# Patient Record
Sex: FEMALE | Race: WHITE | NOT HISPANIC OR LATINO | Employment: FULL TIME | URBAN - METROPOLITAN AREA
[De-identification: names, ages, dates, MRNs, and addresses within clinical notes are randomized per-mention and may not be internally consistent; named-entity substitution may affect disease eponyms.]

---

## 2022-02-28 ENCOUNTER — TRANSFERRED RECORDS (OUTPATIENT)
Dept: HEALTH INFORMATION MANAGEMENT | Facility: CLINIC | Age: 57
End: 2022-02-28

## 2022-04-05 ENCOUNTER — TRANSFERRED RECORDS (OUTPATIENT)
Dept: HEALTH INFORMATION MANAGEMENT | Facility: CLINIC | Age: 57
End: 2022-04-05
Payer: COMMERCIAL

## 2022-05-11 ENCOUNTER — TRANSFERRED RECORDS (OUTPATIENT)
Dept: HEALTH INFORMATION MANAGEMENT | Facility: CLINIC | Age: 57
End: 2022-05-11
Payer: COMMERCIAL

## 2022-05-20 ENCOUNTER — TELEPHONE (OUTPATIENT)
Dept: ORTHOPEDICS | Facility: CLINIC | Age: 57
End: 2022-05-20
Payer: COMMERCIAL

## 2022-05-20 NOTE — TELEPHONE ENCOUNTER
OLELIJAHF Procedure L2 - L5 Consult / Elizabeth Burgos @ [facility] in [place] / Imaging done at [facility] in [place] (myelogram) and [facility] (MRI done in 2018 and 2019)    [facility] Call Center    Phone Message:  Please contact pt if SHE needs to sign a release for imaging, or if SHE needs to have the imaging transferred to [facility].    May a detailed message be left on voicemail: Yes     Reason for Call:  Imaging INQUIRY    Action Taken: Message routed to:  [facility] ([facility]): Team    Travel Screening: Not Applicable

## 2022-05-23 NOTE — TELEPHONE ENCOUNTER
DIAGNOSIS: OLLIF Procedure L2 - L5 Consult / Elizabeth Burgos @ [facility] in [place] / Imaging done at [facility] in [place] (myelogram) and [facility] (MRI done in 2018 and 2019)   APPOINTMENT DATE: 6.7.22   NOTES STATUS DETAILS   OFFICE NOTE from other provider Media Tab Scanned Date:  06/04/2022  Internal    Care Everywhere 01/01/2022 TO 06/03/2022 - Alyse Estrada MD - [facility]  10/23/2015 - Sai Coleman MD - [facility]  06/22/2015 - [facility] [place]  08/07/2014 - [facility]  11/01/2005 - [facility]   OPERATIVE REPORT Care Everywhere 4.30.19, 11.2.18, 7.13.18- [facility]   MEDICATION LIST Internal    LABS     CBC/DIFF Care Everywhere    MRI PACS 5.19.10 lumbar spine,   5.19.10 thoracic spine, [facility]   MYELOGRAM In Process Waiting on Imaging Disc..   XRAYS (IMAGES & REPORTS) PACS 4.12.19 thoracic spine, [facility]  7.13.18 spine, [facility]  3.8.18 lumbar spine, [facility]     Action 5.23.22 8:53 AM JOIE   Action Taken Faxed request to [facility] for records [phone]. Faxed request to [facility] [phone] and [facility] [phone] for images      Action 5.31.22 10:40 AM JOIE   Action Taken Faxed another request to [facility]. Faxed a request to [facility] for MRI images. Faxed another request to [facility].    6/1 - [facility] said they need signed JOE     Action June 2, 2022 4:37 PM MT   Action Taken Called Pt for JOE, pt confirmed she will come into the clinic to complete the form. Once JOE is completed, recs will be req again.     Action Lori 3, 2022 9:14 AM MT   Action Taken PT came in to [facility] clinic and signed JOE, JOE scanned & sent to HIM.   CSS sent a req to [facility] [phone].   4:37 PM  Recvd medical records from [facility] and sent to urgent HIM scan [phone].      Action June 6, 2022 8:47 AM MT   Action Taken CSS called [facility] [phone], rep states he will push over imgs.   4:20 PM  CSS recvd imgs and  resolved to PACS.      Action June 7, 2022 11:11 AM MT   Action Taken Recvd JOE for Orthology and sent to HIM.  Sent fax to [facility] for Physical Therapy notes.  Called [facility], they stated that they sent out a disc with the myelogram and it will be late for the appt.     Action June 8, 2022 12:52 PM MT   Action Taken CSS recvd img disc and sent to [facility] for upload.

## 2022-06-04 ENCOUNTER — HEALTH MAINTENANCE LETTER (OUTPATIENT)
Age: 57
End: 2022-06-04

## 2022-06-06 DIAGNOSIS — M54.9 BACK PAIN: Primary | ICD-10-CM

## 2022-06-07 ENCOUNTER — OFFICE VISIT (OUTPATIENT)
Dept: ORTHOPEDICS | Facility: CLINIC | Age: 57
End: 2022-06-07
Payer: COMMERCIAL

## 2022-06-07 ENCOUNTER — ANCILLARY PROCEDURE (OUTPATIENT)
Dept: GENERAL RADIOLOGY | Facility: CLINIC | Age: 57
End: 2022-06-07
Attending: ORTHOPAEDIC SURGERY
Payer: COMMERCIAL

## 2022-06-07 ENCOUNTER — PRE VISIT (OUTPATIENT)
Dept: ORTHOPEDICS | Facility: CLINIC | Age: 57
End: 2022-06-07
Payer: COMMERCIAL

## 2022-06-07 ENCOUNTER — HOSPITAL ENCOUNTER (OUTPATIENT)
Facility: CLINIC | Age: 57
End: 2022-06-07
Payer: COMMERCIAL

## 2022-06-07 DIAGNOSIS — M54.9 BACK PAIN: ICD-10-CM

## 2022-06-07 DIAGNOSIS — M41.50 DEGENERATIVE SCOLIOSIS IN ADULT PATIENT: ICD-10-CM

## 2022-06-07 DIAGNOSIS — M54.16 LEFT LUMBAR RADICULOPATHY: ICD-10-CM

## 2022-06-07 DIAGNOSIS — M47.816 SPONDYLOSIS OF LUMBAR SPINE: Primary | ICD-10-CM

## 2022-06-07 DIAGNOSIS — M47.816 SPONDYLOSIS OF LUMBAR SPINE: ICD-10-CM

## 2022-06-07 PROCEDURE — 99204 OFFICE O/P NEW MOD 45 MIN: CPT | Performed by: PHYSICIAN ASSISTANT

## 2022-06-07 PROCEDURE — 72082 X-RAY EXAM ENTIRE SPI 2/3 VW: CPT | Mod: 76 | Performed by: STUDENT IN AN ORGANIZED HEALTH CARE EDUCATION/TRAINING PROGRAM

## 2022-06-07 PROCEDURE — 72082 X-RAY EXAM ENTIRE SPI 2/3 VW: CPT | Performed by: STUDENT IN AN ORGANIZED HEALTH CARE EDUCATION/TRAINING PROGRAM

## 2022-06-07 RX ORDER — TIZANIDINE 2 MG/1
TABLET ORAL
Status: ON HOLD | COMMUNITY
Start: 2021-11-26 | End: 2022-08-19

## 2022-06-07 NOTE — LETTER
"    6/7/2022         RE: Joan Stallworth  [address]  [place] [address]        Dear Colleague,    Thank you for referring your patient, Joan Stallworth, to the [facility] [place]. Please see a copy of my visit note below.    Spine surgery hx:  11/02/2018 - ACDF C4-7, 50% inferior corpectomy of C4 [Medtronic Elite anterior cervical plate, PEEK/Tritanium device cages] for cervical stenosis C4-7 with radiculopathy & upper extremity weakness (Dr. Alexis Cox)  04/30/2019 - PSF C3-T2, cervical laminoforaminotomy C5-6 bilateral and T1-T2 bilateral [[place] lateral mass/pedicle screws] for cervical stenosis/ myelopathy, anterior hardware failrue, radiculopathy, neck pain (Dr. Alexis Cox)      REASON FOR CONSULTATION: Consult (Low back pain )     REFERRING PHYSICIAN: No ref. provider found   PCP:Tamie Mixon    History of Present Illness:    56 year old female LHD who presents today for evaluation of chronic low back pain and left leg radicular symptoms.  Patient is here for second opinion, has been seen by Dr. Alyse Tavares with [facility] who has recommended OLLIF L2-5.  Patient reports that her main complaint is difficulty walking due to \"very shifted\" stance towards the right.  Patient says that she has been off balance with her stance for multiple years, but has become significantly worse in the last 6 months.  She has started needing to use a cane in last 3 to 4 weeks due to the severe pain.  If she attempts to stand straight, it worsens her severe left lower extremity leg pain.  Her left leg symptoms radiate from her buttock into the groin, inner thigh, then radiate to outside of the knee, lateral calf, and into the arch of her foot.  She does not have much right lower extremity symptoms, but states that her right leg feels \"tired\".  Leg and back pain is worse with walking and standing.  Pain is improved with sitting.  She can only stand/walk for " 30 minutes maximum before pain becomes too severe.  She has attempted physical therapy, but this only made her pain worse.  Her medication she takes nortriptyline and tizanidine.  She had an injection in her low back April 11, 2022 which provided some relief for about a week.  They have also started her with a scoliosis brace that she wears for about 4 hours a day which is not helpful for her pain.    For neck symptoms, she says she continues to have some difficulty with this after previous cervical fusion surgeries with Dr. Cox, but this is not her main complaint. Continues to have some neck pain and hand symptoms.    Back 35%, Leg 65%,  Left > Right  Worse: standing/ walking  Better: sitting    Previous treatment:   - Physical therapy: attempted recently, worsened symptoms  - injections: April 11, 2022 BILLY (? Level) - 1 week of LBP relief  - Medications: tizandine, nortriptyline   - other: scoliosis brace on right (4hr per day) - not helpful    Social history:  Ambulates with assistance of cane  Work:  ([employer] work, meetings, currently works from home)  Tobacco use: denies  Lives at home      Oswestry (KATTY) Questionnaire    OSWESTRY DISABILITY INDEX 6/7/2022   Count 9   Sum 28   Oswestry Score (%) 62.22   Some recent data might be hidden         ROS:  A 12-point review of systems was completed and is negative except for otherwise noted above in the history of present illness.    Med Hx:  No past medical history on file.    Surg Hx:  No past surgical history on file.    Allergies:  Allergies   Allergen Reactions     Imitrex [Sumatriptan] Hives and Swelling     Sulfa Drugs Hives and Swelling     Lobster [Crustaceans] Hives and Rash       Meds:  Current Outpatient Medications   Medication     Cholecalciferol (VITAMIN D3 PO)     melatonin 5 MG tablet     NORTRIPTYLINE HCL PO     ASPIRIN PO     calcium-vitamin D (CALTRATE) 600-400 MG-UNIT per tablet     Chromium-Cinnamon (CINNAMON PLUS CHROMIUM PO)      Coenzyme Q10 (COQ10 PO)     GLUCOSAMINE SULFATE PO     Omega-3 Fatty Acids (OMEGA-3 FISH OIL PO)     tiZANidine (ZANAFLEX) 2 MG tablet     No current facility-administered medications for this visit.       Fam Hx:  No family history on file.    P/S Hx:  Social History     Tobacco Use     Smoking status: Former Smoker     Smokeless tobacco: Never Used   Substance Use Topics     Alcohol use: Not on file         Physical Exam:  Very pleasant, healthy appearing, alert, oriented x 3, cooperative.  Normal mood and affect.  Not in cardiorespiratory distress.  There were no vitals taken for this visit.  abormal upright posture, torso tilting towards right side, pain with attempting to stand straight upright.    Abnormal, severe antalgic gait without assistive device.  No imbalance.  unable to walk on toes and on heels with ease due to low back and left leg pain.  Severe pain with attempting to lay flat while supine, results in severe radicular left leg shooting pain  Back: no deformity, no skin lesions or surgical scars.  Localizes pain at L4-S1 with radiation to left.  Tenderness: (+) midline thoracic and lumbar, (+) paraspinal - left quadratus lumborum, (-) R and L PSIS.  ROM:   limitied painful extension and flexion    Neuro Exam:  Motor:        LOWER EXTREMITY Left Right   Hip flexion 5/5 5/5   Knee flexion 5/5 5/5   Knee extension 5/5 5/5   Ankle dorsiflexion 5/5 5/5   Ankle plantarflexion 5/5 5/5   Great toe extension 5/5 5/5      Sensory: decreased sensation to light touch in L4 & L5 distributions on the left.   Reflexes:  Knee 1+ bilat.  Ankle 1+ bilat.  (-) Babinski, (-) clonus.    Lower Extremity:  Equal leg lengths, full pulses, (-) atrophy / asymmetry.  Full painless passive knee and ankle motion.  - log roll. - hip ROM bilat    Straight leg raise: (-) right, (+) left.  Hip impingement: (-) right, (-) left.  NELA/Miles's: (-) right, (-) left.      Sacroiliac Joint Tests: unable to preform due to severe  LLE radicular pain patient experienced while lying flat on her back   Right Left   Uma Finger Test  - -   NELA  n/a n/a   Thigh Thrust: n/a n/a   Pelvic Compression Test  n/a n/a   Palpation  - -   Pelvic Gapping  n/a n/a   Gaenslen s Test  n/a n/a   Sacral Thrust (SI) n/a n/a     Imagin22 EOS full spine standing AP/lateral views: Cervical spine hardware present anterior C4-7 and posterior C3-T2. degenerative scoliosis curvature, left apex lumbar curve measured at 23 degrees from L3-S1. Right L3-4 lateral listesis. R>L symmetric disc collapse with osteophyte spurring L3-4, L4-5, L5-S1. L>R asymmetric disc collapse L2-3. right lumbosacral transitional vertebra.   CVA: 9.1cm to right    Impression:   56/f LHD with:  Lumbar spine:  1. Degenerative scoliosis, coronal imbalance (CVA 9.1cm to the right) with severe LLE radicular symptoms and chronic low back pain    2. History of 2018 ACDF C4-7 with pseudoarthrosis requiring 2019 PSF C3-T2 (Dr. Cox), with residual symptoms  Cervical spine:  1.  S/p AP fusion C3-T2 (2018, 2019 Dr. Cox), uncertain fusion status, with continued neck pain.    Plan:   Reviewed patient's EOS x-rays with her today which demonstrate significant degenerative scoliosis with worsening of coronal alignment compared to x-rays from 2018.  Interestingly, she has more severe symptoms along the left lower extremity in the distribution that suggests L3 radicular symptoms in addition to L4-5.  It is possible that there is findings of disc herniation/stretch on the left side at L4-5 and L5-S1 to be contributing to these symptoms.  We will need to obtain her CT [facility], but would also recommend getting thoracic and lumbar MRI.  Additionally would recommend getting supine and side bending views to assess the flexibility of the scoliotic curvature.  We will also need a updated DEXA scan to assess her bone density quality.  We will have her see Dr. Prado  Bari for BMD follow-up and prehabilitation.  We will have patient follow-up once she has completed the MRI, DEXA, XR supine/side bending films and when we have obtained at the CT myelogram results.  We briefly discussed that the surgery would likely involve T10-pelvis fusion.  Briefly discussed anterior/posterior versus posterior alone approach to fusion, but this all depends on what the advanced imaging shows us.    - ordered XR scoliosis supine & side bending views   - ordered DEXA  - ordered MRI lumbar/ thoracic w/o contrast   - retrieve records of CT myelogram cervical/ thoracic/ lumbar (from [facility], done February 2022)  - PM&R referral - Dr. Candelaria for BMD and pre-habilitation  - follow up virtual to review imaging and discuss surgical plan (tentative T10-pelvis)     All questions and concerns were answered to the patient's apparent satisfaction before leaving the clinic.     Respectfully,  Dorene Smith (jamilah Miller PA-C    Attestation:  I (Dr. Everette Guadarrama - Spine Surgeon) have personally evaluated patient with BELEN Smith, and agree with findings and plan outlined in the note, which I also edited.  I discussed at length with the patient/family, explained the nature of spinal condition, and formulated workup and/or treatment plan together.  All questions were answered to the best of my ability and to patient's apparent satisfaction.    45 minutes spent on the date of the encounter doing chart review/review of outside records/review of test results/interpretation of tests/patient visit/documentation/discussion with other provider(s)/discussion with patient and family.      Everette Guadarrama MD    Orthopaedic Spine Surgery  Dept Orthopaedic Surgery, [facility]  [phone] office, [phone] pager  [email or web address]

## 2022-06-07 NOTE — PROGRESS NOTES
"Spine surgery hx:  11/02/2018 - ACDF C4-7, 50% inferior corpectomy of C4 [Medtronic Elite anterior cervical plate, PEEK/Tritanium device cages] for cervical stenosis C4-7 with radiculopathy & upper extremity weakness (Dr. Alexis Cox)  04/30/2019 - PSF C3-T2, cervical laminoforaminotomy C5-6 bilateral and T1-T2 bilateral [[place] lateral mass/pedicle screws] for cervical stenosis/ myelopathy, anterior hardware failrue, radiculopathy, neck pain (Dr. Alexis Cox)      REASON FOR CONSULTATION: Consult (Low back pain )     REFERRING PHYSICIAN: No ref. provider found   PCP:Tamie Mixon    History of Present Illness:    56 year old female LHD who presents today for evaluation of chronic low back pain and left leg radicular symptoms.  Patient is here for second opinion, has been seen by Dr. Alyse Tavares with [facility] who has recommended OLLIF L2-5.  Patient reports that her main complaint is difficulty walking due to \"very shifted\" stance towards the right.  Patient says that she has been off balance with her stance for multiple years, but has become significantly worse in the last 6 months.  She has started needing to use a cane in last 3 to 4 weeks due to the severe pain.  If she attempts to stand straight, it worsens her severe left lower extremity leg pain.  Her left leg symptoms radiate from her buttock into the groin, inner thigh, then radiate to outside of the knee, lateral calf, and into the arch of her foot.  She does not have much right lower extremity symptoms, but states that her right leg feels \"tired\".  Leg and back pain is worse with walking and standing.  Pain is improved with sitting.  She can only stand/walk for 30 minutes maximum before pain becomes too severe.  She has attempted physical therapy, but this only made her pain worse.  Her medication she takes nortriptyline and tizanidine.  She had an injection in her low back April 11, 2022 which provided some relief for " about a week.  They have also started her with a scoliosis brace that she wears for about 4 hours a day which is not helpful for her pain.    For neck symptoms, she says she continues to have some difficulty with this after previous cervical fusion surgeries with Dr. Cox, but this is not her main complaint. Continues to have some neck pain and hand symptoms.    Back 35%, Leg 65%,  Left > Right  Worse: standing/ walking  Better: sitting    Previous treatment:   - Physical therapy: attempted recently, worsened symptoms  - injections: April 11, 2022 BILLY (? Level) - 1 week of LBP relief  - Medications: tizandine, nortriptyline   - other: scoliosis brace on right (4hr per day) - not helpful    Social history:  Ambulates with assistance of cane  Work:  (desk work, meetings, currently works from home)  Tobacco use: denies  Lives at home      Oswestry (KATTY) Questionnaire    OSWESTRY DISABILITY INDEX 6/7/2022   Count 9   Sum 28   Oswestry Score (%) 62.22   Some recent data might be hidden         ROS:  A 12-point review of systems was completed and is negative except for otherwise noted above in the history of present illness.    Med Hx:  No past medical history on file.    Surg Hx:  No past surgical history on file.    Allergies:  Allergies   Allergen Reactions     Imitrex [Sumatriptan] Hives and Swelling     Sulfa Drugs Hives and Swelling     Lobster [Crustaceans] Hives and Rash       Meds:  Current Outpatient Medications   Medication     Cholecalciferol (VITAMIN D3 PO)     melatonin 5 MG tablet     NORTRIPTYLINE HCL PO     ASPIRIN PO     calcium-vitamin D (CALTRATE) 600-400 MG-UNIT per tablet     Chromium-Cinnamon (CINNAMON PLUS CHROMIUM PO)     Coenzyme Q10 (COQ10 PO)     GLUCOSAMINE SULFATE PO     Omega-3 Fatty Acids (OMEGA-3 FISH OIL PO)     tiZANidine (ZANAFLEX) 2 MG tablet     No current facility-administered medications for this visit.       Fam Hx:  No family history on file.    P/S Hx:  Social  History     Tobacco Use     Smoking status: Former Smoker     Smokeless tobacco: Never Used   Substance Use Topics     Alcohol use: Not on file         Physical Exam:  Very pleasant, healthy appearing, alert, oriented x 3, cooperative.  Normal mood and affect.  Not in cardiorespiratory distress.  There were no vitals taken for this visit.  abormal upright posture, torso tilting towards right side, pain with attempting to stand straight upright.    Abnormal, severe antalgic gait without assistive device.  No imbalance.  unable to walk on toes and on heels with ease due to low back and left leg pain.  Severe pain with attempting to lay flat while supine, results in severe radicular left leg shooting pain  Back: no deformity, no skin lesions or surgical scars.  Localizes pain at L4-S1 with radiation to left.  Tenderness: (+) midline thoracic and lumbar, (+) paraspinal - left quadratus lumborum, (-) R and L PSIS.  ROM:   limitied painful extension and flexion    Neuro Exam:  Motor:        LOWER EXTREMITY Left Right   Hip flexion 5/5 5/5   Knee flexion 5/5 5/5   Knee extension 5/5 5/5   Ankle dorsiflexion 5/5 5/5   Ankle plantarflexion 5/5 5/5   Great toe extension 5/5 5/5      Sensory: decreased sensation to light touch in L4 & L5 distributions on the left.   Reflexes:  Knee 1+ bilat.  Ankle 1+ bilat.  (-) Babinski, (-) clonus.    Lower Extremity:  Equal leg lengths, full pulses, (-) atrophy / asymmetry.  Full painless passive knee and ankle motion.  - log roll. - hip ROM bilat    Straight leg raise: (-) right, (+) left.  Hip impingement: (-) right, (-) left.  NELA/Miles's: (-) right, (-) left.      Sacroiliac Joint Tests: unable to preform due to severe LLE radicular pain patient experienced while lying flat on her back   Right Left   Uma Finger Test  - -   NELA  n/a n/a   Thigh Thrust: n/a n/a   Pelvic Compression Test  n/a n/a   Palpation  - -   Pelvic Gapping  n/a n/a   Gaenslen s Test  n/a n/a   Sacral  Thrust (SI) n/a n/a     Imagin22 EOS full spine standing AP/lateral views: Cervical spine hardware present anterior C4-7 and posterior C3-T2. degenerative scoliosis curvature, left apex lumbar curve measured at 23 degrees from L3-S1. Right L3-4 lateral listesis. R>L symmetric disc collapse with osteophyte spurring L3-4, L4-5, L5-S1. L>R asymmetric disc collapse L2-3. right lumbosacral transitional vertebra.   CVA: 9.1cm to right    Impression:   56/f LHD with:  Lumbar spine:  1. Degenerative scoliosis, coronal imbalance (CVA 9.1cm to the right) with severe LLE radicular symptoms and chronic low back pain    2. History of 2018 ACDF C4-7 with pseudoarthrosis requiring 2019 PSF C3-T2 (Dr. Cox), with residual symptoms  Cervical spine:  1.  S/p AP fusion C3-T2 (2018, 2019 Dr. Cox), uncertain fusion status, with continued neck pain.    Plan:   Reviewed patient's EOS x-rays with her today which demonstrate significant degenerative scoliosis with worsening of coronal alignment compared to x-rays from 2018.  Interestingly, she has more severe symptoms along the left lower extremity in the distribution that suggests L3 radicular symptoms in addition to L4-5.  It is possible that there is findings of disc herniation/stretch on the left side at L4-5 and L5-S1 to be contributing to these symptoms.  We will need to obtain her CT [facility], but would also recommend getting thoracic and lumbar MRI.  Additionally would recommend getting supine and side bending views to assess the flexibility of the scoliotic curvature.  We will also need a updated DEXA scan to assess her bone density quality.  We will have her see Dr. Eve Candelaria for BMD follow-up and prehabilitation.  We will have patient follow-up once she has completed the MRI, DEXA, XR supine/side bending films and when we have obtained at the CT myelogram results.  We briefly discussed that the surgery would likely involve  "T10-pelvis fusion.  Briefly discussed anterior/posterior versus posterior alone approach to fusion, but this all depends on what the advanced imaging shows us.    - ordered XR scoliosis supine & side bending views   - ordered DEXA  - ordered MRI lumbar/ thoracic w/o contrast   - retrieve records of CT myelogram cervical/ thoracic/ lumbar (from [facility], done February 2022)  - PM&R referral - Dr. Candelaria for BMD and pre-habilitation  - follow up virtual to review imaging and discuss surgical plan (tentative T10-pelvis)     All questions and concerns were answered to the patient's apparent satisfaction before leaving the clinic.     Respectfully,  Dorene Smith (jamilah Yanes), BELEN    Attestation:  I (Dr. Everette Guadarrama - Spine Surgeon) have personally evaluated patient with BELEN Smith, and agree with findings and plan outlined in the note, which I also edited.  I discussed at length with the patient/family, explained the nature of spinal condition, and formulated workup and/or treatment plan together.  All questions were answered to the best of my ability and to patient's apparent satisfaction.    45 minutes spent on the date of the encounter doing chart review/review of outside records/review of test results/interpretation of tests/patient visit/documentation/discussion with other provider(s)/discussion with patient and family.      Everette Guadarrama MD    Orthopaedic Spine Surgery  Dept Orthopaedic Surgery, [facility]  [phone] office, [phone] pager  [email or web address]    Addendum 8/4/22:  Reviewed CT myelogram thoracic and lumbar reports from 2/28/22 (done at [facility] [place], [place]).  Impression:  \"Multilevel degenerative spondylosis and degenerative disc disease result in up to mild spinal canal stenosis at T6-7 through T8-9.\"  \"Multilevel degenerative spondylosis and degenerative disc disease.  Findings are most prominent at L2-3 where there is moderate left " "subarticular spinal canal stenosis, with mild bilateral foraminal stenosis.  Additional levels of bilateral foraminal stenosis, some severe, as detailed above.\"    "

## 2022-06-09 ENCOUNTER — TELEPHONE (OUTPATIENT)
Dept: ORTHOPEDICS | Facility: CLINIC | Age: 57
End: 2022-06-09
Payer: COMMERCIAL

## 2022-06-09 NOTE — TELEPHONE ENCOUNTER
[facility] Call Center    Phone Message    May a detailed message be left on voicemail: yes     Reason for Call: Other: patient needs Dorene to submit PA for her MRI & Dexa scan orders and her Spine referral      Action Taken: Message routed to:  [facility] ([facility]): ortho    Travel Screening: Not Applicable     Patient was directed by her ins. To have PA submitted for all the orders that were placed     Please c/b once done

## 2022-06-10 NOTE — TELEPHONE ENCOUNTER
See phone message from pt.   I called her back.  She stated she called her Insurance to check on approval for the above 4 imaging tests & the spine referral order for Bone health eval. That we ordered & they told her needs PA.  Can be faxed to fax#[phone] Ref#[id number].    I explained that radiology Dept & PMR dept each have their own financial securing depts who work on Insurance approval but I can fax the 5 orders & dictation to Insurance.  She requested that , so I faxed above.  She understands to be sure to contact rad & PMR to see if approved before appt so she doesn't get stuck with bills.  She has their ph#s.   Call back prn.  Pt agreed.  Izzy Hernández RN.

## 2022-06-27 ENCOUNTER — ANCILLARY PROCEDURE (OUTPATIENT)
Dept: BONE DENSITY | Facility: CLINIC | Age: 57
End: 2022-06-27
Attending: PHYSICIAN ASSISTANT
Payer: COMMERCIAL

## 2022-06-27 ENCOUNTER — TELEPHONE (OUTPATIENT)
Dept: ORTHOPEDICS | Facility: CLINIC | Age: 57
End: 2022-06-27

## 2022-06-27 DIAGNOSIS — M47.816 SPONDYLOSIS OF LUMBAR SPINE: ICD-10-CM

## 2022-06-27 DIAGNOSIS — M41.50 DEGENERATIVE SCOLIOSIS IN ADULT PATIENT: ICD-10-CM

## 2022-06-27 DIAGNOSIS — M54.16 LEFT LUMBAR RADICULOPATHY: ICD-10-CM

## 2022-06-27 PROCEDURE — 77080 DXA BONE DENSITY AXIAL: CPT | Performed by: INTERNAL MEDICINE

## 2022-06-27 PROCEDURE — 77081 DXA BONE DENSITY APPENDICULR: CPT | Mod: XU | Performed by: INTERNAL MEDICINE

## 2022-06-27 NOTE — TELEPHONE ENCOUNTER
[facility] Call Center    Phone Message    May a detailed message be left on voicemail: yes     Reason for Call: Other: Patient calling to have the Pre auth for Dexa scan and MRI sent to her insurance. Please call patient regarding this     Action Taken: Other: [facility]    Travel Screening: Not Applicable

## 2022-06-28 NOTE — TELEPHONE ENCOUNTER
See phone message from pt.  I called  Her back.    I provided her with ph#s for rad financial dept who do the PA for imaging done & scheduled.  Call back prn.  Pt agreed.    Izzy Hernández RN.

## 2022-07-06 ENCOUNTER — VIRTUAL VISIT (OUTPATIENT)
Dept: PHYSICAL MEDICINE AND REHAB | Facility: CLINIC | Age: 57
End: 2022-07-06
Attending: PHYSICIAN ASSISTANT
Payer: COMMERCIAL

## 2022-07-06 DIAGNOSIS — M54.16 LEFT LUMBAR RADICULOPATHY: ICD-10-CM

## 2022-07-06 DIAGNOSIS — M47.816 SPONDYLOSIS OF LUMBAR SPINE: ICD-10-CM

## 2022-07-06 DIAGNOSIS — M81.8 OTHER OSTEOPOROSIS WITHOUT CURRENT PATHOLOGICAL FRACTURE: Primary | ICD-10-CM

## 2022-07-06 DIAGNOSIS — M41.50 DEGENERATIVE SCOLIOSIS IN ADULT PATIENT: ICD-10-CM

## 2022-07-06 PROCEDURE — 99204 OFFICE O/P NEW MOD 45 MIN: CPT | Mod: 95 | Performed by: PHYSICAL MEDICINE & REHABILITATION

## 2022-07-06 ASSESSMENT — ANXIETY QUESTIONNAIRES
7. FEELING AFRAID AS IF SOMETHING AWFUL MIGHT HAPPEN: NOT AT ALL
1. FEELING NERVOUS, ANXIOUS, OR ON EDGE: NOT AT ALL
GAD7 TOTAL SCORE: 8
5. BEING SO RESTLESS THAT IT IS HARD TO SIT STILL: NEARLY EVERY DAY
2. NOT BEING ABLE TO STOP OR CONTROL WORRYING: NOT AT ALL
7. FEELING AFRAID AS IF SOMETHING AWFUL MIGHT HAPPEN: NOT AT ALL
GAD7 TOTAL SCORE: 8
4. TROUBLE RELAXING: MORE THAN HALF THE DAYS
8. IF YOU CHECKED OFF ANY PROBLEMS, HOW DIFFICULT HAVE THESE MADE IT FOR YOU TO DO YOUR WORK, TAKE CARE OF THINGS AT HOME, OR GET ALONG WITH OTHER PEOPLE?: NOT DIFFICULT AT ALL
3. WORRYING TOO MUCH ABOUT DIFFERENT THINGS: SEVERAL DAYS
GAD7 TOTAL SCORE: 8
6. BECOMING EASILY ANNOYED OR IRRITABLE: MORE THAN HALF THE DAYS

## 2022-07-06 ASSESSMENT — PATIENT HEALTH QUESTIONNAIRE - PHQ9
SUM OF ALL RESPONSES TO PHQ QUESTIONS 1-9: 9
SUM OF ALL RESPONSES TO PHQ QUESTIONS 1-9: 9
10. IF YOU CHECKED OFF ANY PROBLEMS, HOW DIFFICULT HAVE THESE PROBLEMS MADE IT FOR YOU TO DO YOUR WORK, TAKE CARE OF THINGS AT HOME, OR GET ALONG WITH OTHER PEOPLE: SOMEWHAT DIFFICULT

## 2022-07-06 NOTE — LETTER
2022     RE: Joan Stallworth  [address]  [place] [address]     Dear Colleague,    Thank you for referring your patient, Joan Stallworth, to the [facility] PHYSICAL MEDICINE AND REHABILITATION CLINIC [place] at [facility]. Please see a copy of my visit note below.    .Joan is a 56 year old who is being evaluated via a billable telephone visit.      What phone number would you like to be contacted at? [phone]  How would you like to obtain your AVS? MyChart  Phone call duration: 32 minutes    Answers for HPI/ROS submitted by the patient on 2022  If you checked off any problems, how difficult have these problems made it for you to do your work, take care of things at home, or get along with other people?: Somewhat difficult  PHQ9 TOTAL SCORE: 9  VIPUL 7 TOTAL SCORE: 8           PM&R Clinic Note     Patient Name: Joan Stallworth : 1965 Medical Record: [id number]     Requesting Physician/clinician: Dorene Smith, BELEN           History of Present Illness:     Joan Stallworth is a 56 year old female referred by Dr. Guadarrama for bone health/functional maximization prior to possible spine surgery. She has a history of 2 fragility fractures over the age of 50.  Recent DXA demonstrated osteopenia with lowest T-score of -1.5 at the radius.  She has never taken medication for osteoporosis or HRT.  Menopause was at roughly age 54.  She has a hsitory of vitamin D deficiency and takes 5,000 international unit(s) of D daily but I don't have a recent level for review. She does not get regular exercise.  Her last fall was 2022.        Fracture History:   Left ankle fracture age 44, fall while shoveling  5th metatarsal (not sure side) age 50, fall down 2 steps while vacuuming  Right ankle fracture age 54, rolled foot off a 6 inch retaining wall     Osteoanabolic contraindications:  No history of osteosarcoma, no stroke or MI history    Antiresorptive contraindications:  No invasive dental procedures planned    Age over 50 (female) or 75 (male): Yes, 56  Prior failed surgery (fracture, pseudarthrosis, or instrumentation failure):  Yes  Personal or family hx of previous low trauma fx of the hip or spine:  Yes  Diabetes Mellitus greater than 10 years or poor control:  No  Inflammatory arthritis:  No  Chronic corticosteroid use (5mg/day or   for more than 90 days):  No  Chronic kidney disease stage 3 and up:  No  Limited mobility:  Yes, started using a cane 4 weeks ago  Smokin pack year history, quit 40 years ago  Alcohol use 3 or more per day:    Personal or family hx of metabolic bone disease:  None  Liver disease:  No  Oophorectomy:  NA  Cancer tx: Rectal AIN 3, mass excised and checked regularly  Vitamin D Deficiency:  Not certain when last checked, does take vitamin D supplement  (5,000 international unit(s) daily)  Hx weight loss surgery:  No,  Anorexic as young adult  Hx frequent falls:  Last fall in 2022 on ice, no injury    Regular Physical Activity:  No regular exercise    Osteoporosis Medication Use:  No  Noted loss of height:  Dietary Intake:  Reproductive History: No, menopause at around age 54, no HRT    No medication for osteoporosis         Past Medical and Surgical History:     No past medical history on file.  No past surgical history on file.         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         Medications:     Current Outpatient Medications   Medication Sig Dispense Refill     ASPIRIN PO Take 81 mg by mouth daily       Cholecalciferol (VITAMIN D3 PO) Take 5,000 Units by mouth daily       melatonin 5 MG tablet        NORTRIPTYLINE HCL PO Take 10 mg by mouth At Bedtime       tiZANidine (ZANAFLEX) 2 MG tablet        calcium-vitamin D (CALTRATE) 600-400 MG-UNIT per tablet Take 1 tablet by mouth daily (Patient not taking: Reported on 6/7/2022)       Chromium-Cinnamon (CINNAMON PLUS CHROMIUM PO) Take 1 capsule by mouth daily (Patient not taking: Reported on 6/7/2022)       Coenzyme Q10 (COQ10 PO) Take 100 mg by mouth daily (Patient not taking: Reported on 6/7/2022)       GLUCOSAMINE SULFATE PO Take 1,500 mg by mouth daily (Patient not taking: Reported on 6/7/2022)       Omega-3 Fatty Acids (OMEGA-3 FISH OIL PO) Take 1 g by mouth daily (Patient not taking: Reported on 6/7/2022)              Allergies:     Allergies   Allergen Reactions     Imitrex [Sumatriptan] Hives and Swelling     Sulfa Drugs Hives and Swelling     Lobster [Crustaceans] Hives and Rash            ROS:     A focused ROS is negative other than the symptoms noted above in the HPI.           Physical Examiniation:     VITAL SIGNS: There were no vitals taken for this visit.  BMI: Estimated body mass index is 37.91 kg/m  as calculated from the following:    Height as of 10/23/15: 1.6 m (5' 3\").    Weight as of 10/23/15: 97.1 kg (214 lb).         Laboratory/Imaging:        Impression  Based on BMD diagnosis is consistent with low bone density based on WHO criteria Ref. 1     Results   Lumbar spine was excluded from analysis due to degenerative changes     Left femoral neck  T-score -0.4      Right femoral neck  T-score -0.7      Left Total femur  T-score 1.6 , BMD " is 1.216 g/cm2.     Right total femur  T-score 0.9, BMD is 1.116 g/cm2.     Radius: T-score -1.5, BMD 0.598 g/cm2     Interval change  No prior study available for comparison        Repeat  Recommend repeat DXA in 2 years.     Principal result :  Lora Christopher MD, ALBER HUTCHINSON  Division of Endocrinology and Diabetes    Department of Medicine     Technical quality  Satisfactory.   The spine was excluded from analysis. A distal radius was added as additional site of analysis. Results of the the distal radius are taken inconsideration in this report, for images and BMD values compare separate PACS report            Assessment/Plan:     This is a 64 YO female with OP based on fragility fracture history who is considering spine surgery.  She is at risk for fall related fracture and would benefit from PT given her mobility limitations.  She would benefit from an osteoanaoblic or an antiresorptive, but she would like to review patient handouts prior to committing to a specific drug as she has a lot of drug intolerances.  I will check vitamin Dand related bone health labs today given history of vitamin D deficiency.  RTC in 2-3 weeks to monitor progress.  I will give her a handout for osteoanabolics and antiresorptives and have directed her to the [email or web address] website (Reclast, Evenity, forteo, abaloparatide).        Eve Candelaria, DO  Physical Medicine & Rehabilitation    I spent a total of 32 minutes with Joan Stallworth  during today's phone visit.     16 minutes spent on the date of the encounter doing chart review, history and exam, documentation and further activities as noted above

## 2022-07-06 NOTE — PROGRESS NOTES
.Joan is a 56 year old who is being evaluated via a billable telephone visit.      What phone number would you like to be contacted at? [phone]  How would you like to obtain your AVS? Carin  Phone call duration: 32 minutes    Answers for HPI/ROS submitted by the patient on 2022  If you checked off any problems, how difficult have these problems made it for you to do your work, take care of things at home, or get along with other people?: Somewhat difficult  PHQ9 TOTAL SCORE: 9  VIPUL 7 TOTAL SCORE: 8           PM&R Clinic Note     Patient Name: Joan Stallworth : 1965 Medical Record: [id number]     Requesting Physician/clinician: Dorene Smith PA-C           History of Present Illness:     Joan Stallworth is a 56 year old female referred by Dr. Guadarrama for bone health/functional maximization prior to possible spine surgery. She has a history of 2 fragility fractures over the age of 50.  Recent DXA demonstrated osteopenia with lowest T-score of -1.5 at the radius.  She has never taken medication for osteoporosis or HRT.  Menopause was at roughly age 54.  She has a hsitory of vitamin D deficiency and takes 5,000 international unit(s) of D daily but I don't have a recent level for review. She does not get regular exercise.  Her last fall was 2022.        Fracture History:   Left ankle fracture age 44, fall while shoveling  5th metatarsal (not sure side) age 50, fall down 2 steps while vacuuming  Right ankle fracture age 54, rolled foot off a 6 inch retaining wall     Osteoanabolic contraindications:  No history of osteosarcoma, no stroke or MI history   Antiresorptive contraindications:  No invasive dental procedures planned    Age over 50 (female) or 75 (male): Yes, 56  Prior failed surgery (fracture, pseudarthrosis, or instrumentation failure):  Yes  Personal or family hx of previous low trauma fx of the hip or spine:  Yes  Diabetes Mellitus greater than 10 years or poor control:  No  Inflammatory  arthritis:  No  Chronic corticosteroid use (5mg/day or   for more than 90 days):  No  Chronic kidney disease stage 3 and up:  No  Limited mobility:  Yes, started using a cane 4 weeks ago  Smokin pack year history, quit 40 years ago  Alcohol use 3 or more per day:    Personal or family hx of metabolic bone disease:  None  Liver disease:  No  Oophorectomy:  NA  Cancer tx: Rectal AIN 3, mass excised and checked regularly  Vitamin D Deficiency:  Not certain when last checked, does take vitamin D supplement  (5,000 international unit(s) daily)  Hx weight loss surgery:  No,  Anorexic as young adult  Hx frequent falls:  Last fall in 2022 on ice, no injury    Regular Physical Activity:  No regular exercise    Osteoporosis Medication Use:  No  Noted loss of height:  Dietary Intake:  Reproductive History: No, menopause at around age 54, no HRT    No medication for osteoporosis         Past Medical and Surgical History:     No past medical history on file.  No past surgical history on file.         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         Medications:     Current Outpatient Medications   Medication Sig Dispense Refill     ASPIRIN PO Take 81 mg by mouth daily       Cholecalciferol (VITAMIN D3 PO) Take 5,000  "Units by mouth daily       melatonin 5 MG tablet        NORTRIPTYLINE HCL PO Take 10 mg by mouth At Bedtime       tiZANidine (ZANAFLEX) 2 MG tablet        calcium-vitamin D (CALTRATE) 600-400 MG-UNIT per tablet Take 1 tablet by mouth daily (Patient not taking: Reported on 6/7/2022)       Chromium-Cinnamon (CINNAMON PLUS CHROMIUM PO) Take 1 capsule by mouth daily (Patient not taking: Reported on 6/7/2022)       Coenzyme Q10 (COQ10 PO) Take 100 mg by mouth daily (Patient not taking: Reported on 6/7/2022)       GLUCOSAMINE SULFATE PO Take 1,500 mg by mouth daily (Patient not taking: Reported on 6/7/2022)       Omega-3 Fatty Acids (OMEGA-3 FISH OIL PO) Take 1 g by mouth daily (Patient not taking: Reported on 6/7/2022)              Allergies:     Allergies   Allergen Reactions     Imitrex [Sumatriptan] Hives and Swelling     Sulfa Drugs Hives and Swelling     Lobster [Crustaceans] Hives and Rash              ROS:     A focused ROS is negative other than the symptoms noted above in the HPI.           Physical Examiniation:     VITAL SIGNS: There were no vitals taken for this visit.  BMI: Estimated body mass index is 37.91 kg/m  as calculated from the following:    Height as of 10/23/15: 1.6 m (5' 3\").    Weight as of 10/23/15: 97.1 kg (214 lb).           Laboratory/Imaging:          Impression  Based on BMD diagnosis is consistent with low bone density based on WHO criteria Ref. 1     Results   Lumbar spine was excluded from analysis due to degenerative changes     Left femoral neck  T-score -0.4      Right femoral neck  T-score -0.7      Left Total femur  T-score 1.6 , BMD is 1.216 g/cm2.     Right total femur  T-score 0.9, BMD is 1.116 g/cm2.     Radius: T-score -1.5, BMD 0.598 g/cm2     Interval change  No prior study available for comparison        Repeat  Recommend repeat DXA in 2 years.     Principal result :  Lora Christopher MD, ALBER HUTCHINSON  Division of Endocrinology and Diabetes    Department of " Medicine     Technical quality  Satisfactory.   The spine was excluded from analysis. A distal radius was added as additional site of analysis. Results of the the distal radius are taken inconsideration in this report, for images and BMD values compare separate PACS report              Assessment/Plan:     This is a 64 YO female with OP based on fragility fracture history who is considering spine surgery.  She is at risk for fall related fracture and would benefit from PT given her mobility limitations.  She would benefit from an osteoanaoblic or an antiresorptive, but she would like to review patient handouts prior to committing to a specific drug as she has a lot of drug intolerances.  I will check vitamin Dand related bone health labs today given history of vitamin D deficiency.  RTC in 2-3 weeks to monitor progress.  I will give her a handout for osteoanabolics and antiresorptives and have directed her to the NOF.org website (Reclast, Evenity, forteo, abaloparatide).        Eve Candelaria, DO  Physical Medicine & Rehabilitation    I spent a total of 32 minutes with Joan Stallworth  during today's phone visit.     16 minutes spent on the date of the encounter doing chart review, history and exam, documentation and further activities as noted above

## 2022-07-06 NOTE — NURSING NOTE
Chief Complaint   Patient presents with     Telephone     Bone health, Lumbar spondylosis  referred by Dr Everette Guadarrama

## 2022-07-07 RX ORDER — OXYCODONE HYDROCHLORIDE 5 MG/1
5 TABLET ORAL EVERY 4 HOURS PRN
Status: CANCELLED | OUTPATIENT
Start: 2022-07-07

## 2022-07-07 RX ORDER — FENTANYL CITRATE 50 UG/ML
25 INJECTION, SOLUTION INTRAMUSCULAR; INTRAVENOUS EVERY 5 MIN PRN
Status: CANCELLED | OUTPATIENT
Start: 2022-07-07

## 2022-07-07 RX ORDER — SODIUM CHLORIDE, SODIUM LACTATE, POTASSIUM CHLORIDE, CALCIUM CHLORIDE 600; 310; 30; 20 MG/100ML; MG/100ML; MG/100ML; MG/100ML
INJECTION, SOLUTION INTRAVENOUS CONTINUOUS
Status: CANCELLED | OUTPATIENT
Start: 2022-07-07

## 2022-07-07 RX ORDER — ONDANSETRON 2 MG/ML
4 INJECTION INTRAMUSCULAR; INTRAVENOUS EVERY 30 MIN PRN
Status: CANCELLED | OUTPATIENT
Start: 2022-07-07

## 2022-07-07 RX ORDER — ONDANSETRON 4 MG/1
4 TABLET, ORALLY DISINTEGRATING ORAL EVERY 30 MIN PRN
Status: CANCELLED | OUTPATIENT
Start: 2022-07-07

## 2022-07-07 RX ORDER — MEPERIDINE HYDROCHLORIDE 25 MG/ML
12.5 INJECTION INTRAMUSCULAR; INTRAVENOUS; SUBCUTANEOUS
Status: CANCELLED | OUTPATIENT
Start: 2022-07-07

## 2022-07-07 RX ORDER — FENTANYL CITRATE 50 UG/ML
25 INJECTION, SOLUTION INTRAMUSCULAR; INTRAVENOUS
Status: CANCELLED | OUTPATIENT
Start: 2022-07-07

## 2022-07-07 RX ORDER — HYDROMORPHONE HYDROCHLORIDE 1 MG/ML
0.2 INJECTION, SOLUTION INTRAMUSCULAR; INTRAVENOUS; SUBCUTANEOUS EVERY 5 MIN PRN
Status: CANCELLED | OUTPATIENT
Start: 2022-07-07

## 2022-07-07 NOTE — OR NURSING
The following inbasket message was sent with high importance: Pt's sedated MRI has to be R/S because pt unaware of Pre-op H&P requirement  Received: Today  Coleen Keller, Izzy Thompson RN  Cc: Chaya Freeman; Mamie Smith; Erica Ivey RN Hi Lisa,     Pt is scheduled for ANESTHESIA OUT OF OR Magnetic resonance imaging thoracic and lumbar spine @0800 with sedation at the [facility] tomorrow. This needs to be rescheduled.     I did the pre-op call with pt. She said no one told her she needed a pre-op H&P or a Covid test. She said  she took the whole week off. While I was on the phone with her, she called  and [facility] to try and get an H&P today-no openings. My supervisor also checked PAC but there are no appts. Pt said she has had so many problems trying to get this scheduled. She said she is hyper frustrated.     Pt made a pre-op H&P appt on July 12 at [facility]. She has an appt with Dr Guadarrama on 7/28 to discuss MRI findings so the MRI will need to be rescheduled before then.     Please follow up with pt and help her to reschedule.     Thanks so much.     Coleen Keller, ODELL, BSN   Preanesthesia Screening   [phone]

## 2022-07-22 ENCOUNTER — MYC MEDICAL ADVICE (OUTPATIENT)
Dept: PHYSICAL MEDICINE AND REHAB | Facility: CLINIC | Age: 57
End: 2022-07-22

## 2022-07-27 ENCOUNTER — TELEPHONE (OUTPATIENT)
Dept: PHYSICAL MEDICINE AND REHAB | Facility: CLINIC | Age: 57
End: 2022-07-27

## 2022-07-27 NOTE — TELEPHONE ENCOUNTER
[facility] Call Center    Phone Message    May a detailed message be left on voicemail: yes     Reason for Call: Form or Letter   Type or form/letter needing completion: Ingredient list  Provider: Eve Candelaria  Date form needed: ASAP  Once completed: Mail form to Name: Basim Franco, at Address: [address] [place] [address]   [place] [address]    Patient hasnt received and ingredient list to fill out due to her medication allergies. Patient states it was suppose to be sent a month ago for her to fill out from Dr. Candelaria. Please call patient back at # [phone].      Action Taken: Message routed to:  [facility] PM&R    Travel Screening: Not Applicable

## 2022-07-27 NOTE — TELEPHONE ENCOUNTER
She is looking for patient handouts for osteoanabolics and ant-resorptive drugs.  Adding Cyn and Mary Ann to make sure they are provided to her.

## 2022-07-28 ENCOUNTER — MYC MEDICAL ADVICE (OUTPATIENT)
Dept: PHYSICAL MEDICINE AND REHAB | Facility: CLINIC | Age: 57
End: 2022-07-28

## 2022-07-28 NOTE — TELEPHONE ENCOUNTER
Pt was given website for medication handouts 7/6/22    Recommended to have her labwork done at a [facility] since her primary clinic ([facility] sends out to [place] and results are not received to referring provider (us) in a timely manner.    Pt was booked yesterday 7/27 for her lab draw at [facility] for 8/5    Physical therapy - no activity on booking this  Booked for MRI with anesthesia on 8/19/22    Responded to pt via [employer] - no voice mail option on her phone.    Checking to see what she thought she was supposed to fill out for Dr Candelaria.    Cyn Dalton, RN on 7/28/2022 at 7:14 PM

## 2022-07-28 NOTE — PROGRESS NOTES
C telopeptide orders corrected.  [facility] order cannot be seen by [facility]    Recommended to have pt get lab at a  clinic so results are readily available.  Cyn Dalton RN on 7/28/2022 at 6:27 PM

## 2022-08-05 ENCOUNTER — LAB (OUTPATIENT)
Dept: LAB | Facility: CLINIC | Age: 57
End: 2022-08-05
Payer: COMMERCIAL

## 2022-08-05 DIAGNOSIS — M47.816 SPONDYLOSIS OF LUMBAR SPINE: ICD-10-CM

## 2022-08-05 DIAGNOSIS — M54.16 LEFT LUMBAR RADICULOPATHY: ICD-10-CM

## 2022-08-05 DIAGNOSIS — M81.8 OTHER OSTEOPOROSIS WITHOUT CURRENT PATHOLOGICAL FRACTURE: ICD-10-CM

## 2022-08-05 DIAGNOSIS — M41.50 DEGENERATIVE SCOLIOSIS IN ADULT PATIENT: ICD-10-CM

## 2022-08-05 LAB — PTH-INTACT SERPL-MCNC: 47 PG/ML (ref 15–65)

## 2022-08-05 PROCEDURE — 82523 COLLAGEN CROSSLINKS: CPT | Mod: 90

## 2022-08-05 PROCEDURE — 99000 SPECIMEN HANDLING OFFICE-LAB: CPT

## 2022-08-05 PROCEDURE — 83970 ASSAY OF PARATHORMONE: CPT

## 2022-08-05 PROCEDURE — 83937 ASSAY OF OSTEOCALCIN: CPT | Mod: 90

## 2022-08-05 PROCEDURE — 82306 VITAMIN D 25 HYDROXY: CPT

## 2022-08-05 PROCEDURE — 36415 COLL VENOUS BLD VENIPUNCTURE: CPT

## 2022-08-05 PROCEDURE — 80053 COMPREHEN METABOLIC PANEL: CPT

## 2022-08-06 LAB
ALBUMIN SERPL-MCNC: 4 G/DL (ref 3.4–5)
ALP SERPL-CCNC: 107 U/L (ref 40–150)
ALT SERPL W P-5'-P-CCNC: 60 U/L (ref 0–50)
ANION GAP SERPL CALCULATED.3IONS-SCNC: 8 MMOL/L (ref 3–14)
AST SERPL W P-5'-P-CCNC: 34 U/L (ref 0–45)
BILIRUB SERPL-MCNC: 0.6 MG/DL (ref 0.2–1.3)
BUN SERPL-MCNC: 15 MG/DL (ref 7–30)
CALCIUM SERPL-MCNC: 9 MG/DL (ref 8.5–10.1)
CHLORIDE BLD-SCNC: 105 MMOL/L (ref 94–109)
CO2 SERPL-SCNC: 25 MMOL/L (ref 20–32)
CREAT SERPL-MCNC: 0.59 MG/DL (ref 0.52–1.04)
DEPRECATED CALCIDIOL+CALCIFEROL SERPL-MC: 79 UG/L (ref 20–75)
GFR SERPL CREATININE-BSD FRML MDRD: >90 ML/MIN/1.73M2
GLUCOSE BLD-MCNC: 180 MG/DL (ref 70–99)
OSTEOCALCIN SERPL-MCNC: 18 NG/ML
POTASSIUM BLD-SCNC: 3.8 MMOL/L (ref 3.4–5.3)
PROT SERPL-MCNC: 7.3 G/DL (ref 6.8–8.8)
SODIUM SERPL-SCNC: 138 MMOL/L (ref 133–144)

## 2022-08-07 ENCOUNTER — MYC MEDICAL ADVICE (OUTPATIENT)
Dept: PHYSICAL MEDICINE AND REHAB | Facility: CLINIC | Age: 57
End: 2022-08-07

## 2022-08-07 LAB — COLLAGEN CTX SERPL-MCNC: 331 PG/ML

## 2022-08-08 NOTE — TELEPHONE ENCOUNTER
Routed message to Dr Candelaria, who is out of office until 8/24/22 (writer notified pt of this date to expect response)    Routing message:  Pt's comments on her lab tests you ordered (done on 8/5/22) next f/u with you is 9/28/22 / medication handouts were mailed to her & physical therapy info sent in the encounter of 7/28/22.  Pending decision on drug therapy.

## 2022-08-08 NOTE — TELEPHONE ENCOUNTER
Polyethylene Glycol and Polysorbate 20 (Sorbitan) are added to pt's allergy list per her request.    A warning appeared when for vit D (on Joan's medication list) when Sorbitan was added, this writer is unable to evaluate potential interaction or make recommendations on the drug.  However, Pt has sent another [facility] message on 8/7/22 indicating: I will stop the Vit D intake.    her comment was based on reviewing her lab values done on 8/5/22.    Cyn Dalton RN on 8/8/2022 at 6:04 PM

## 2022-08-15 ENCOUNTER — TELEPHONE (OUTPATIENT)
Dept: ORTHOPEDICS | Facility: CLINIC | Age: 57
End: 2022-08-15

## 2022-08-15 NOTE — TELEPHONE ENCOUNTER
[facility] Call Center    Phone Message    May a detailed message be left on voicemail: yes     Reason for Call: Other: Skye calling regarding received notices that MRI was approved, but thoracic not approved, got two difference notices that some imaging has been denied, insurance needs to know that it medically necessary. Please call patient regarding this [phone]     Action Taken: Other: [facility]    Travel Screening: Not Applicable

## 2022-08-16 NOTE — TELEPHONE ENCOUNTER
See phone message from pt.  I called her back & confirmed that PA dept [facility] did get both MRIs approved that are scheduled for 8-19-22 & they did notify pt.  RTN appt with  scheduled.  Call back prn. Pt agreed.  Izzy Hernández RN.

## 2022-08-18 ENCOUNTER — ANESTHESIA EVENT (OUTPATIENT)
Dept: SURGERY | Facility: CLINIC | Age: 57
End: 2022-08-18
Payer: COMMERCIAL

## 2022-08-18 ASSESSMENT — LIFESTYLE VARIABLES: TOBACCO_USE: 1

## 2022-08-18 NOTE — ANESTHESIA PREPROCEDURE EVALUATION
Anesthesia Pre-Procedure Evaluation    Patient: Joan Stallworth   MRN: [id number] : 1965        Procedure : Procedure(s):  ANESTHESIA OUT OF OR Magnetic resonance imaging lumbar and thoracic spine without contrast @1100          Past Medical History:   Diagnosis Date     PONV (postoperative nausea and vomiting)       No past surgical history on file.   Allergies   Allergen Reactions     Imitrex [Sumatriptan] Hives and Swelling     Polyethylene Glycol Unknown     Polysorbate [Sorbitan] Unknown     Sulfa Drugs Hives and Swelling     Lobster [Crustaceans] Hives and Rash      Social History     Tobacco Use     Smoking status: Former Smoker     Smokeless tobacco: Never Used   Substance Use Topics     Alcohol use: Not on file      Wt Readings from Last 1 Encounters:   10/23/15 97.1 kg (214 lb)        Anesthesia Evaluation   Pt has had prior anesthetic. Type: General.    History of anesthetic complications  - PONV.  emergence agitation.    ROS/MED HX  ENT/Pulmonary: Comment: Quit smoking 40 years ago    (+) tobacco use, Past use, 12  Pack-Year Hx,  Intermittent, asthma     Neurologic:  - neg neurologic ROS     Cardiovascular:     (+) Dyslipidemia hypertension-----    METS/Exercise Tolerance:     Hematologic:       Musculoskeletal: Comment: Hx C4-C5 diskectomy in       GI/Hepatic: Comment: GERD: Not on medication    Anal Intraepithelial Neoplasia III    (+) GERD, Other,     Renal/Genitourinary:  - neg Renal ROS     Endo:  - neg endo ROS     Psychiatric/Substance Use: Comment: Adjustment disorder    (+) psychiatric history other (comment) and depression     Infectious Disease:  - neg infectious disease ROS     Malignancy:  - neg malignancy ROS     Other:      (+) , H/O Chronic Pain,        Physical Exam    Airway        Mallampati: II   TM distance: > 3 FB   Neck ROM: limited   Mouth opening: > 3 cm    Respiratory Devices and Support         Dental  no notable dental history         Cardiovascular   cardiovascular  exam normal       Rhythm and rate: regular and normal     Pulmonary           breath sounds clear to auscultation           OUTSIDE LABS:  CBC: No results found for: WBC, HGB, HCT, PLT  BMP:   Lab Results   Component Value Date     08/05/2022    POTASSIUM 3.8 08/05/2022    CHLORIDE 105 08/05/2022    CO2 25 08/05/2022    BUN 15 08/05/2022    CR 0.59 08/05/2022     (H) 08/05/2022     COAGS: No results found for: PTT, INR, FIBR  POC: No results found for: BGM, HCG, HCGS  HEPATIC:   Lab Results   Component Value Date    ALBUMIN 4.0 08/05/2022    PROTTOTAL 7.3 08/05/2022    ALT 60 (H) 08/05/2022    AST 34 08/05/2022    ALKPHOS 107 08/05/2022    BILITOTAL 0.6 08/05/2022     OTHER:   Lab Results   Component Value Date    AZEB 9.0 08/05/2022       Anesthesia Plan    ASA Status:  2      Anesthesia Type: General.     - Airway: ETT   Induction: Intravenous, Propofol.   Maintenance: Balanced.   Techniques and Equipment:     - Airway: Video-Laryngoscope     - Lines/Monitors: 2nd IV     Consents    Anesthesia Plan(s) and associated risks, benefits, and realistic alternatives discussed. Questions answered and patient/representative(s) expressed understanding.     - Discussed: Risks, Benefits and Alternatives for the PROCEDURE were discussed     - Discussed with:  Patient      - Extended Intubation/Ventilatory Support Discussed: No.      - Patient is DNR/DNI Status: No    Use of blood products discussed: No .     Postoperative Care    Pain management: IV analgesics.   PONV prophylaxis: Ondansetron (or other 5HT-3), Dexamethasone or Solumedrol, Background Propofol Infusion     Comments:                German Fernandez MD

## 2022-08-19 ENCOUNTER — HOSPITAL ENCOUNTER (OUTPATIENT)
Dept: MRI IMAGING | Facility: CLINIC | Age: 57
Discharge: HOME | End: 2022-08-19
Attending: PHYSICIAN ASSISTANT | Admitting: ANESTHESIOLOGY
Payer: COMMERCIAL

## 2022-08-19 ENCOUNTER — HOSPITAL ENCOUNTER (OUTPATIENT)
Facility: CLINIC | Age: 57
Discharge: HOME | End: 2022-08-19
Attending: ANESTHESIOLOGY | Admitting: ANESTHESIOLOGY
Payer: COMMERCIAL

## 2022-08-19 ENCOUNTER — ANESTHESIA (OUTPATIENT)
Dept: SURGERY | Facility: CLINIC | Age: 57
End: 2022-08-19
Payer: COMMERCIAL

## 2022-08-19 VITALS
HEIGHT: 63 IN | RESPIRATION RATE: 16 BRPM | OXYGEN SATURATION: 94 % | HEART RATE: 79 BPM | BODY MASS INDEX: 38.44 KG/M2 | DIASTOLIC BLOOD PRESSURE: 69 MMHG | WEIGHT: 216.93 LBS | TEMPERATURE: 98 F | SYSTOLIC BLOOD PRESSURE: 111 MMHG

## 2022-08-19 DIAGNOSIS — M47.816 SPONDYLOSIS OF LUMBAR SPINE: ICD-10-CM

## 2022-08-19 DIAGNOSIS — M41.50 DEGENERATIVE SCOLIOSIS IN ADULT PATIENT: ICD-10-CM

## 2022-08-19 DIAGNOSIS — M54.16 LEFT LUMBAR RADICULOPATHY: ICD-10-CM

## 2022-08-19 LAB — GLUCOSE BLDC GLUCOMTR-MCNC: 158 MG/DL (ref 70–99)

## 2022-08-19 PROCEDURE — 710N000012 HC RECOVERY PHASE 2, PER MINUTE

## 2022-08-19 PROCEDURE — 72146 MRI CHEST SPINE W/O DYE: CPT | Mod: 26 | Performed by: RADIOLOGY

## 2022-08-19 PROCEDURE — 72148 MRI LUMBAR SPINE W/O DYE: CPT

## 2022-08-19 PROCEDURE — 250N000011 HC RX IP 250 OP 636: Performed by: STUDENT IN AN ORGANIZED HEALTH CARE EDUCATION/TRAINING PROGRAM

## 2022-08-19 PROCEDURE — 370N000017 HC ANESTHESIA TECHNICAL FEE, PER MIN

## 2022-08-19 PROCEDURE — 999N000141 HC STATISTIC PRE-PROCEDURE NURSING ASSESSMENT

## 2022-08-19 PROCEDURE — 72148 MRI LUMBAR SPINE W/O DYE: CPT | Mod: 26 | Performed by: RADIOLOGY

## 2022-08-19 PROCEDURE — 250N000009 HC RX 250: Performed by: STUDENT IN AN ORGANIZED HEALTH CARE EDUCATION/TRAINING PROGRAM

## 2022-08-19 PROCEDURE — 258N000003 HC RX IP 258 OP 636: Performed by: STUDENT IN AN ORGANIZED HEALTH CARE EDUCATION/TRAINING PROGRAM

## 2022-08-19 PROCEDURE — 72146 MRI CHEST SPINE W/O DYE: CPT

## 2022-08-19 PROCEDURE — 250N000025 HC SEVOFLURANE, PER MIN

## 2022-08-19 PROCEDURE — 82962 GLUCOSE BLOOD TEST: CPT

## 2022-08-19 PROCEDURE — 710N000009 HC RECOVERY PHASE 1, LEVEL 1, PER MIN

## 2022-08-19 RX ORDER — KETAMINE HYDROCHLORIDE 10 MG/ML
INJECTION INTRAMUSCULAR; INTRAVENOUS PRN
Status: DISCONTINUED | OUTPATIENT
Start: 2022-08-19 | End: 2022-08-19

## 2022-08-19 RX ORDER — SODIUM CHLORIDE, SODIUM LACTATE, POTASSIUM CHLORIDE, CALCIUM CHLORIDE 600; 310; 30; 20 MG/100ML; MG/100ML; MG/100ML; MG/100ML
INJECTION, SOLUTION INTRAVENOUS CONTINUOUS
Status: DISCONTINUED | OUTPATIENT
Start: 2022-08-19 | End: 2022-08-19 | Stop reason: HOSPADM

## 2022-08-19 RX ORDER — PROPOFOL 10 MG/ML
INJECTION, EMULSION INTRAVENOUS CONTINUOUS PRN
Status: DISCONTINUED | OUTPATIENT
Start: 2022-08-19 | End: 2022-08-19

## 2022-08-19 RX ORDER — ONDANSETRON 2 MG/ML
INJECTION INTRAMUSCULAR; INTRAVENOUS PRN
Status: DISCONTINUED | OUTPATIENT
Start: 2022-08-19 | End: 2022-08-19

## 2022-08-19 RX ORDER — PROPOFOL 10 MG/ML
INJECTION, EMULSION INTRAVENOUS PRN
Status: DISCONTINUED | OUTPATIENT
Start: 2022-08-19 | End: 2022-08-19

## 2022-08-19 RX ORDER — HYDROMORPHONE HYDROCHLORIDE 1 MG/ML
0.2 INJECTION, SOLUTION INTRAMUSCULAR; INTRAVENOUS; SUBCUTANEOUS EVERY 5 MIN PRN
Status: DISCONTINUED | OUTPATIENT
Start: 2022-08-19 | End: 2022-08-19 | Stop reason: HOSPADM

## 2022-08-19 RX ORDER — LABETALOL HYDROCHLORIDE 5 MG/ML
10 INJECTION, SOLUTION INTRAVENOUS
Status: DISCONTINUED | OUTPATIENT
Start: 2022-08-19 | End: 2022-08-19 | Stop reason: HOSPADM

## 2022-08-19 RX ORDER — SODIUM CHLORIDE, SODIUM LACTATE, POTASSIUM CHLORIDE, CALCIUM CHLORIDE 600; 310; 30; 20 MG/100ML; MG/100ML; MG/100ML; MG/100ML
INJECTION, SOLUTION INTRAVENOUS CONTINUOUS PRN
Status: DISCONTINUED | OUTPATIENT
Start: 2022-08-19 | End: 2022-08-19

## 2022-08-19 RX ORDER — LIDOCAINE HYDROCHLORIDE 20 MG/ML
INJECTION, SOLUTION INFILTRATION; PERINEURAL PRN
Status: DISCONTINUED | OUTPATIENT
Start: 2022-08-19 | End: 2022-08-19

## 2022-08-19 RX ORDER — OXYCODONE HYDROCHLORIDE 5 MG/1
5 TABLET ORAL EVERY 4 HOURS PRN
Status: DISCONTINUED | OUTPATIENT
Start: 2022-08-19 | End: 2022-08-19 | Stop reason: HOSPADM

## 2022-08-19 RX ORDER — DEXAMETHASONE SODIUM PHOSPHATE 4 MG/ML
INJECTION, SOLUTION INTRA-ARTICULAR; INTRALESIONAL; INTRAMUSCULAR; INTRAVENOUS; SOFT TISSUE PRN
Status: DISCONTINUED | OUTPATIENT
Start: 2022-08-19 | End: 2022-08-19

## 2022-08-19 RX ORDER — ONDANSETRON 2 MG/ML
4 INJECTION INTRAMUSCULAR; INTRAVENOUS EVERY 30 MIN PRN
Status: DISCONTINUED | OUTPATIENT
Start: 2022-08-19 | End: 2022-08-19 | Stop reason: HOSPADM

## 2022-08-19 RX ORDER — ONDANSETRON 4 MG/1
4 TABLET, ORALLY DISINTEGRATING ORAL EVERY 30 MIN PRN
Status: DISCONTINUED | OUTPATIENT
Start: 2022-08-19 | End: 2022-08-19 | Stop reason: HOSPADM

## 2022-08-19 RX ORDER — DEXMEDETOMIDINE HYDROCHLORIDE 4 UG/ML
INJECTION, SOLUTION INTRAVENOUS PRN
Status: DISCONTINUED | OUTPATIENT
Start: 2022-08-19 | End: 2022-08-19

## 2022-08-19 RX ORDER — FENTANYL CITRATE 50 UG/ML
25 INJECTION, SOLUTION INTRAMUSCULAR; INTRAVENOUS EVERY 5 MIN PRN
Status: DISCONTINUED | OUTPATIENT
Start: 2022-08-19 | End: 2022-08-19 | Stop reason: HOSPADM

## 2022-08-19 RX ORDER — FENTANYL CITRATE 50 UG/ML
INJECTION, SOLUTION INTRAMUSCULAR; INTRAVENOUS PRN
Status: DISCONTINUED | OUTPATIENT
Start: 2022-08-19 | End: 2022-08-19

## 2022-08-19 RX ADMIN — LIDOCAINE HYDROCHLORIDE 100 MG: 20 INJECTION, SOLUTION INFILTRATION; PERINEURAL at 11:06

## 2022-08-19 RX ADMIN — PROPOFOL 40 MG: 10 INJECTION, EMULSION INTRAVENOUS at 11:09

## 2022-08-19 RX ADMIN — PHENYLEPHRINE HYDROCHLORIDE 100 MCG: 10 INJECTION INTRAVENOUS at 11:14

## 2022-08-19 RX ADMIN — PROPOFOL 125 MCG/KG/MIN: 10 INJECTION, EMULSION INTRAVENOUS at 11:06

## 2022-08-19 RX ADMIN — Medication 20 MG: at 11:09

## 2022-08-19 RX ADMIN — Medication 50 MG: at 11:06

## 2022-08-19 RX ADMIN — Medication 30 MG: at 11:06

## 2022-08-19 RX ADMIN — DEXAMETHASONE SODIUM PHOSPHATE 8 MG: 4 INJECTION, SOLUTION INTRA-ARTICULAR; INTRALESIONAL; INTRAMUSCULAR; INTRAVENOUS; SOFT TISSUE at 11:06

## 2022-08-19 RX ADMIN — ONDANSETRON 4 MG: 2 INJECTION INTRAMUSCULAR; INTRAVENOUS at 12:59

## 2022-08-19 RX ADMIN — DEXMEDETOMIDINE 8 MCG: 100 INJECTION, SOLUTION, CONCENTRATE INTRAVENOUS at 12:40

## 2022-08-19 RX ADMIN — SODIUM CHLORIDE, POTASSIUM CHLORIDE, SODIUM LACTATE AND CALCIUM CHLORIDE: 600; 310; 30; 20 INJECTION, SOLUTION INTRAVENOUS at 11:02

## 2022-08-19 RX ADMIN — MIDAZOLAM 2 MG: 1 INJECTION INTRAMUSCULAR; INTRAVENOUS at 10:55

## 2022-08-19 RX ADMIN — DEXMEDETOMIDINE 8 MCG: 100 INJECTION, SOLUTION, CONCENTRATE INTRAVENOUS at 12:33

## 2022-08-19 RX ADMIN — FENTANYL CITRATE 50 MCG: 50 INJECTION, SOLUTION INTRAMUSCULAR; INTRAVENOUS at 11:09

## 2022-08-19 RX ADMIN — ONDANSETRON 4 MG: 2 INJECTION INTRAMUSCULAR; INTRAVENOUS at 11:06

## 2022-08-19 RX ADMIN — PROPOFOL 80 MG: 10 INJECTION, EMULSION INTRAVENOUS at 11:06

## 2022-08-19 RX ADMIN — SUGAMMADEX 200 MG: 100 INJECTION, SOLUTION INTRAVENOUS at 12:39

## 2022-08-19 ASSESSMENT — ACTIVITIES OF DAILY LIVING (ADL)
ADLS_ACUITY_SCORE: 33
ADLS_ACUITY_SCORE: 35
ADLS_ACUITY_SCORE: 35

## 2022-08-19 NOTE — INTERVAL H&P NOTE
"I have reviewed the surgical (or preoperative) H&P that is linked to this encounter, and examined the patient. There are no significant changes    Clinical Conditions Present on Arrival:  Clinically Significant Risk Factors Present on Admission                   # Obesity: Estimated body mass index is 38.43 kg/m  as calculated from the following:    Height as of this encounter: 1.6 m (5' 3\").    Weight as of this encounter: 98.4 kg (216 lb 14.9 oz).       "
TAY Garza

## 2022-08-19 NOTE — DISCHARGE INSTRUCTIONS
"[facility], [place]  Same-Day Surgery   Adult Discharge Orders & Instructions       *Take it easy when you get home.  Remember, same day surgery DOES NOT MEAN SAME DAY RECOVERY!    *Healing is a gradual process and you will need some time to recover - you may be more tired than you realize at first.    *Rest and relax for at least the first 24 hours at home.  You'll feel better and heal faster if you take good care of yourself.    For 24 hours after surgery    A responsible adult must stay with you for at least 24 hours after you leave the hospital.   Do not drink alcohol, drive, or use heavy equipment for 24 hours. This is because you have had anesthesia medications.  Avoid strenuous and risky activities for 24 hours.   You may feel lightheaded, if so, sit for a few minutes before standing.  You may need someone to help you get up. Ask for help when climbing stairs.  If you have nausea: Drink only clear liquids such as water, juice, soda, or broth. Rest may also help. Be sure to drink enough fluids. Move to a  regular diet as you feel able.  You may have a slight fever. Call the doctor if your fever is over 100 F (37.7 C) (taken under the tongue) or lasts longer than 24 hours.  You might have a dry mouth, sore throat, muscle aches or trouble sleeping.  These should go away after 24 hours.  Do not make important or legal decisions.       Call your doctor for any of the followin.  Signs of infection: fever, growing tenderness at the surgery site, a large amount of drainage or bleeding, severe pain, foul-smelling drainage, redness, swelling. Please call if you experience any of these symptoms.    2. If it has been 8 to 10 hours since surgery and you are still not able to urinate (pass water), please call.    3.  If you have a headache for over 24 hours, please call.      To contact a doctor, call your primary care provider or:    ' [phone] and ask for \"the resident on call " "for anesthesia \" (this is the hospital and is answered 24 hours a day)    '   Emergency Department: [facility]: [phone]       (TTY for hearing impaired: [phone])    "

## 2022-08-19 NOTE — ANESTHESIA PROCEDURE NOTES
Airway       Patient location during procedure: OR       Procedure Start/Stop Times: 8/19/2022 11:09 AM  Staff -        Resident/Fellow: German Fernandez MD       Performed By: resident  Consent for Airway        Urgency: elective  Indications and Patient Condition       Indications for airway management: cindi-procedural       Induction type:intravenous       Mask difficulty assessment: 1 - vent by mask    Final Airway Details       Final airway type: endotracheal airway       Successful airway: ETT - single  Endotracheal Airway Details        ETT size (mm): 7.5       Cuffed: yes       Successful intubation technique: video laryngoscopy       VL Blade Size: MAC 4       Grade View of Cords: 1       Adjucts: stylet       Position: Center       Measured from: gums/teeth       Secured at (cm): 21       Bite block used: None    Post intubation assessment        Placement verified by: capnometry, equal breath sounds and chest rise        Number of attempts at approach: 1       Number of other approaches attempted: 0       Secured with: silk tape       Ease of procedure: easy       Dentition: Intact    Medication(s) Administered   Medication Administration Time: 8/19/2022 11:09 AM

## 2022-08-19 NOTE — ANESTHESIA POSTPROCEDURE EVALUATION
Patient: Joan Stallworth    Procedure: Procedure(s):  ANESTHESIA OUT OF OR Magnetic resonance imaging lumbar and thoracic spine without contrast @1100       Anesthesia Type:  General    Note:  Disposition: Outpatient   Postop Pain Control: Uneventful            Sign Out: Well controlled pain   PONV: No   Neuro/Psych: Uneventful            Sign Out: Acceptable/Baseline neuro status   Airway/Respiratory: Uneventful            Sign Out: Acceptable/Baseline resp. status   CV/Hemodynamics: Uneventful            Sign Out: Acceptable CV status; No obvious hypovolemia; No obvious fluid overload   Other NRE: NONE   DID A NON-ROUTINE EVENT OCCUR? No           Last vitals:  Vitals Value Taken Time   /71 08/19/22 1254   Temp 36.8  C (98.2  F) 08/19/22 1242   Pulse 77 08/19/22 1258   Resp 14 08/19/22 1252   SpO2 100 % 08/19/22 1258   Vitals shown include unvalidated device data.    Electronically Signed By: Froylan Martell MD  August 19, 2022  1:00 PM

## 2022-08-23 ASSESSMENT — ENCOUNTER SYMPTOMS
PALPITATIONS: 0
DISTURBANCES IN COORDINATION: 0
LOSS OF CONSCIOUSNESS: 0
SLEEP DISTURBANCES DUE TO BREATHING: 0
PARALYSIS: 0
ORTHOPNEA: 0
EXERCISE INTOLERANCE: 0
JOINT SWELLING: 0
NUMBNESS: 1
LEG PAIN: 1
LIGHT-HEADEDNESS: 0
MUSCLE CRAMPS: 0
NECK PAIN: 1
ARTHRALGIAS: 1
SPEECH CHANGE: 0
TREMORS: 0
MEMORY LOSS: 0
TINGLING: 1
HYPOTENSION: 0
SYNCOPE: 0
MYALGIAS: 1
BACK PAIN: 1
MUSCLE WEAKNESS: 0
DIZZINESS: 0
HEADACHES: 1
SEIZURES: 0
HYPERTENSION: 1
WEAKNESS: 1
STIFFNESS: 1

## 2022-08-24 DIAGNOSIS — M41.50 DEGENERATIVE SCOLIOSIS IN ADULT PATIENT: Primary | ICD-10-CM

## 2022-08-24 DIAGNOSIS — M81.8 OTHER OSTEOPOROSIS WITHOUT CURRENT PATHOLOGICAL FRACTURE: ICD-10-CM

## 2022-08-24 NOTE — RESULT ENCOUNTER NOTE
Braulio Us, can you reach out to Joan to help her set up PT near her home?  Also, I'd like to recheck her vitamin D in 2 months.

## 2022-09-01 ENCOUNTER — OFFICE VISIT (OUTPATIENT)
Dept: ORTHOPEDICS | Facility: CLINIC | Age: 57
End: 2022-09-01
Payer: COMMERCIAL

## 2022-09-01 DIAGNOSIS — M41.50 DEGENERATIVE SCOLIOSIS IN ADULT PATIENT: ICD-10-CM

## 2022-09-01 DIAGNOSIS — M40.36 FLATBACK SYNDROME OF LUMBAR REGION: ICD-10-CM

## 2022-09-01 DIAGNOSIS — M48.061 SPINAL STENOSIS OF LUMBAR REGION WITHOUT NEUROGENIC CLAUDICATION: Primary | ICD-10-CM

## 2022-09-01 PROCEDURE — 99215 OFFICE O/P EST HI 40 MIN: CPT | Mod: GC | Performed by: ORTHOPAEDIC SURGERY

## 2022-09-01 NOTE — LETTER
9/1/2022         RE: Joan Stallworth  [address]  [place] [address]        Dear Colleague,    Thank you for referring your patient, Joan Stallworth, to the [facility] [place]. Please see a copy of my visit note below.    Spine Surgical Hx:  11/02/2018 - ACDF C4-7, 50% inferior corpectomy of C4 [[employer]tronic Elite anterior cervical plate, PEEK/Tritanium device cages] for cervical stenosis C4-7 with radiculopathy & upper extremity weakness (Dr. Alexis Cox)  04/30/2019 - PSF C3-T2, cervical laminoforaminotomy C5-6 bilateral and T1-T2 bilateral [[place] [employer] lateral mass/pedicle screws] for cervical stenosis/ myelopathy, anterior hardware failrue, radiculopathy, neck pain (Dr. Alexis Cox)    BMD Hx:   7/6/22 - DEXA hip/wrist.  T-score = -1.5 (radius).  Imp: Osteopenia.  7/6/22 - Saw Dr. Candelaria (PM&R).  Imp:  Osteoporosis with hx of fragility fracture.  P> Labs.  RTC 2-3 wks.  Will consider osteoanabolics.      Reason for Visit:   Chief Complaint   Patient presents with     RECHECK     MRI and Dexa Scan results       Last clinic visit: 6/7/2022  Previous Impression:  1. Degenerative scoliosis, coronal imbalance (CVA 9.1cm to the right) with severe LLE radicular symptoms and chronic low back pain    2. History of 2018 ACDF C4-7 with pseudoarthrosis requiring 2019 PSF C3-T2 (Dr. Cox), with residual symptoms  Cervical spine:  3.  S/p AP fusion C3-T2 (2018, 2019 Dr. Cox), uncertain fusion status, with continued neck pain.  Previous Plan:  - ordered XR scoliosis supine & side bending views   - ordered DEXA  - ordered MRI lumbar/ thoracic w/o contrast   - retrieve records of CT myelogram cervical/ thoracic/ lumbar (from [facility], done February 2022)  - PM&R referral - Dr. Candelaria for BMD and pre-habilitation  - follow up virtual to review imaging and discuss surgical plan (tentative T10-pelvis)        S> 56-year-old LHD female who presents to clinic for  "review of imaging and to discuss possible surgical plan. She was seen on 6/7/22 for low back pain and radicular symptoms.     She continues to feel her posture is shifted when she is walking and it has become worse over the past several months. She is continuing to use a cane for balance and pain. If she attempts to stand straight, it worsens her severe left lower extremity leg pain.  Her left leg symptoms radiate from her buttock into the groin, inner thigh, then radiate to outside of the knee, lateral calf, and into the arch of her foot.  She does not have much right lower extremity symptoms, but states that her right leg feels \"tired\". Her pain is improved with sitting/rest and worsened by walking and standing.     She attempted 12 sessions of PT at [facility] in [place] from Feb-May of 2022 without relief of symptoms.  They have also started her with a scoliosis brace that she wears for about 4 hours a day which is not helpful for her pain.    She had an injection in her low back April 11, 2022 which provided some relief for about a week.        Oswestry (KATTY) Questionnaire    OSWESTRY DISABILITY INDEX 8/23/2022   Count 10   Sum 19   Oswestry Score (%) 38   Some recent data might be hidden      KATTY 6/7/22 62.22%  KATTY 8/23/22 38%       PROMIS-10 Scores  Global Mental Health Score: (P) 12  Global Physical Health Score: (P) 12  PROMIS TOTAL - SUBSCORES: (P) 24    O>   Alert, oriented x 3, cooperative.  Not in CP distress.  There were no vitals taken for this visit.  Very pleasant, healthy appearing, alert, oriented x 3, cooperative.  Normal mood and affect.  Not in cardiorespiratory distress.  abormal upright posture, torso tilting towards right side, pain with attempting to stand straight upright.    Abnormal, severe antalgic gait without assistive device.  No imbalance.  unable to walk on toes and on heels with ease due to low back and left leg pain.  Severe pain with attempting to lay flat while supine, " "results in severe radicular left leg shooting pain  Back: no deformity, no skin lesions or surgical scars.  Localizes pain at L4-S1 with radiation to left.  Tenderness: (+) midline thoracic and lumbar, (+) paraspinal - left quadratus lumborum, (-) R and L PSIS.  ROM:    limitied painful extension and flexion     Neuro Exam:  Motor:                     LOWER EXTREMITY Left Right   Hip flexion 5/5 5/5   Knee flexion 5/5 5/5   Knee extension 5/5 5/5   Ankle dorsiflexion 5/5 5/5   Ankle plantarflexion 5/5 5/5   Great toe extension 5/5 5/5      Sensory: decreased sensation to light touch in L4 & L5 distributions on the left.   Reflexes:  Knee 1+ bilat.  Ankle 1+ bilat.  (-) Babinski, (-) clonus.     Lower Extremity:  Equal leg lengths, full pulses, (-) atrophy / asymmetry.  Full painless passive knee and ankle motion.  - log roll. - hip ROM bilat     Straight leg raise: (-) right, (+) left.  Hip impingement: (-) right, (-) left.  NELA/Miles's: (-) right, (-) left.                            Imaging:   CT myelogram thoracic and lumbar 2/28/22 (done at [facility] [place], [place]).    \"Multilevel degenerative spondylosis and degenerative disc disease result in up to mild spinal canal stenosis at T6-7 through T8-9.\"  \"Multilevel degenerative spondylosis and degenerative disc disease.  Findings are most prominent at L2-3 where there is moderate left subarticular spinal canal stenosis, with mild bilateral foraminal stenosis.  Additional levels of bilateral foraminal stenosis, some severe, as detailed above.\"    Thoracic and Lumbar  MRI 8/19/22:  Degenerative changes throughout the thoracolumbar spine, without high-grade spinal canal stenosis. Multilevel neural foraminal  narrowings in the lumbar spine.    EOS full spine ap-lat x-rays from 6/7/22 re-reviewed.  Sagittal measurements:  LL 17, ideal 48.5  L4-S1 26, ideal 32  PI 41  PT 27  SVA +4.5cm  TPA 24  GT 31  T10-L2 kyph 4    A>  56/f LHD with:  Lumbar spine:  1. " Degenerative scoliosis, coronal imbalance (CVA 9.1cm to the right) with severe LLE radicular symptoms and chronic low back pain.  2.  Lumbar flatback deformity (LL = 17 deg, ideal 48.5), with compensatory pelvic retroversion (PT = 27).  Cervical spine:  3.  S/p AP fusion C3-T2 (2018, 2019 Dr. Cox), uncertain fusion status, with continued neck pain.  4. Osteoporosis with hx of fragility fractures.    P>   Patient with history of significant degenerative scoliosis with worsening coronal alignment presents today to review imaging.  Her MRI findings show high-grade spinal canal stenosis and multilevel neuroforaminal narrowing in the lumbar spine.  Discussed with the patient at these imaging findings are likely contributing to her significant lumbar radicular symptoms.  Reviewed the DEXA scan patient which showed findings consistent with osteoporosis.  Recommend following up with Dr. Candelaria prior to surgery to initiate Reclast treatment.     -Case request placed:  Posterior instrumented spinal fusion thoracic 10 to sacrum, with bilateral pelvic fixation; Transforaminal lumbar interbody fusion with Davis-Alvares osteotomies lumbar 2-sacrum; Left facetectomy-diskectomy lumbar 1-2; Use of Infuse bone morphogenetic protein Large kit and allograft.  -PAC referral  -Follow up with Dr. Candelaria as scheduled on 9/28/22, recommend initiating Reclast prior to surgery     The patient was seen and discussed with Dr. Thierry Jackson MD  PGY-1 Orthopaedic Surgery    Attestation:  I (Dr. Everette Guadarrama - Spine Surgeon) have personally evaluated patient with PGY-1 Ren[place], and agree with findings and plan outlined in the note, which I also edited.  I discussed at length with the patient/family, explained the nature of spinal condition, and formulated workup and/or treatment plan together.  All questions were answered to the best of my ability and to patient's apparent satisfaction.    40 minutes spent on the date of the  encounter doing chart review/review of outside records/review of test results/interpretation of tests/patient visit/documentation/discussion with other provider(s)/discussion with patient and family.

## 2022-09-01 NOTE — PROGRESS NOTES
Spine Surgical Hx:  11/02/2018 - ACDF C4-7, 50% inferior corpectomy of C4 [Medtronic Elite anterior cervical plate, PEEK/Tritanium device cages] for cervical stenosis C4-7 with radiculopathy & upper extremity weakness (Dr. Alexis Cox)  04/30/2019 - PSF C3-T2, cervical laminoforaminotomy C5-6 bilateral and T1-T2 bilateral [Frances Oasis lateral mass/pedicle screws] for cervical stenosis/ myelopathy, anterior hardware failrue, radiculopathy, neck pain (Dr. Alexis Cox)    BMD Hx:   7/6/22 - DEXA hip/wrist.  T-score = -1.5 (radius).  Imp: Osteopenia.  7/6/22 - Saw Dr. Candelaria (PM&R).  Imp:  Osteoporosis with hx of fragility fracture.  P> Labs.  RTC 2-3 wks.  Will consider osteoanabolics.      Reason for Visit:   Chief Complaint   Patient presents with     RECHECK     MRI and Dexa Scan results       Last clinic visit: 6/7/2022  Previous Impression:  1. Degenerative scoliosis, coronal imbalance (CVA 9.1cm to the right) with severe LLE radicular symptoms and chronic low back pain    2. History of 2018 ACDF C4-7 with pseudoarthrosis requiring 2019 PSF C3-T2 (Dr. Cox), with residual symptoms  Cervical spine:  3.  S/p AP fusion C3-T2 (2018, 2019 Dr. Cox), uncertain fusion status, with continued neck pain.  Previous Plan:  - ordered XR scoliosis supine & side bending views   - ordered DEXA  - ordered MRI lumbar/ thoracic w/o contrast   - retrieve records of CT myelogram cervical/ thoracic/ lumbar (from [facility], done February 2022)  - PM&R referral - Dr. Candelaria for BMD and pre-habilitation  - follow up virtual to review imaging and discuss surgical plan (tentative T10-pelvis)        S> 56-year-old LHD female who presents to clinic for review of imaging and to discuss possible surgical plan. She was seen on 6/7/22 for low back pain and radicular symptoms.     She continues to feel her posture is shifted when she is walking and it has become worse over the past several months. She is continuing to  "use a cane for balance and pain. If she attempts to stand straight, it worsens her severe left lower extremity leg pain.  Her left leg symptoms radiate from her buttock into the groin, inner thigh, then radiate to outside of the knee, lateral calf, and into the arch of her foot.  She does not have much right lower extremity symptoms, but states that her right leg feels \"tired\". Her pain is improved with sitting/rest and worsened by walking and standing.     She attempted 12 sessions of PT at [facility] in [place] from Feb-May of 2022 without relief of symptoms.  They have also started her with a scoliosis brace that she wears for about 4 hours a day which is not helpful for her pain.    She had an injection in her low back April 11, 2022 which provided some relief for about a week.        Oswestry (KATTY) Questionnaire    OSWESTRY DISABILITY INDEX 8/23/2022   Count 10   Sum 19   Oswestry Score (%) 38   Some recent data might be hidden      KATTY 6/7/22 62.22%  KATTY 8/23/22 38%       PROMIS-10 Scores  Global Mental Health Score: (P) 12  Global Physical Health Score: (P) 12  PROMIS TOTAL - SUBSCORES: (P) 24    O>   Alert, oriented x 3, cooperative.  Not in CP distress.  There were no vitals taken for this visit.  Very pleasant, healthy appearing, alert, oriented x 3, cooperative.  Normal mood and affect.  Not in cardiorespiratory distress.  abormal upright posture, torso tilting towards right side, pain with attempting to stand straight upright.    Abnormal, severe antalgic gait without assistive device.  No imbalance.  unable to walk on toes and on heels with ease due to low back and left leg pain.  Severe pain with attempting to lay flat while supine, results in severe radicular left leg shooting pain  Back: no deformity, no skin lesions or surgical scars.  Localizes pain at L4-S1 with radiation to left.  Tenderness: (+) midline thoracic and lumbar, (+) paraspinal - left quadratus lumborum, (-) R and L PSIS.  ROM:    " "limitied painful extension and flexion     Neuro Exam:  Motor:                     LOWER EXTREMITY Left Right   Hip flexion 5/5 5/5   Knee flexion 5/5 5/5   Knee extension 5/5 5/5   Ankle dorsiflexion 5/5 5/5   Ankle plantarflexion 5/5 5/5   Great toe extension 5/5 5/5      Sensory: decreased sensation to light touch in L4 & L5 distributions on the left.   Reflexes:  Knee 1+ bilat.  Ankle 1+ bilat.  (-) Babinski, (-) clonus.     Lower Extremity:  Equal leg lengths, full pulses, (-) atrophy / asymmetry.  Full painless passive knee and ankle motion.  - log roll. - hip ROM bilat     Straight leg raise: (-) right, (+) left.  Hip impingement: (-) right, (-) left.  NELA/Miles's: (-) right, (-) left.                            Imaging:   CT myelogram thoracic and lumbar 2/28/22 (done at [facility] [place], [place]).    \"Multilevel degenerative spondylosis and degenerative disc disease result in up to mild spinal canal stenosis at T6-7 through T8-9.\"  \"Multilevel degenerative spondylosis and degenerative disc disease.  Findings are most prominent at L2-3 where there is moderate left subarticular spinal canal stenosis, with mild bilateral foraminal stenosis.  Additional levels of bilateral foraminal stenosis, some severe, as detailed above.\"    Thoracic and Lumbar  MRI 8/19/22:  Degenerative changes throughout the thoracolumbar spine, without high-grade spinal canal stenosis. Multilevel neural foraminal  narrowings in the lumbar spine.    EOS full spine ap-lat x-rays from 6/7/22 re-reviewed.  Sagittal measurements:  LL 17, ideal 48.5  L4-S1 26, ideal 32  PI 41  PT 27  SVA +4.5cm  TPA 24  GT 31  T10-L2 kyph 4    A>  56/f LHD with:  Lumbar spine:  1. Degenerative scoliosis, coronal imbalance (CVA 9.1cm to the right) with severe LLE radicular symptoms and chronic low back pain.  2.  Lumbar flatback deformity (LL = 17 deg, ideal 48.5), with compensatory pelvic retroversion (PT = 27).  Cervical spine:  3.  S/p AP fusion " C3-T2 (2018, 2019 Dr. Cox), uncertain fusion status, with continued neck pain.  4. Osteoporosis with hx of fragility fractures.    P>   Patient with history of significant degenerative scoliosis with worsening coronal alignment presents today to review imaging.  Her MRI findings show high-grade spinal canal stenosis and multilevel neuroforaminal narrowing in the lumbar spine.  Discussed with the patient at these imaging findings are likely contributing to her significant lumbar radicular symptoms.  Reviewed the DEXA scan patient which showed findings consistent with osteoporosis.  Recommend following up with Dr. Candelaria prior to surgery to initiate Reclast treatment.     -Case request placed:  Posterior instrumented spinal fusion thoracic 10 to sacrum, with bilateral pelvic fixation; Transforaminal lumbar interbody fusion with Davis-Alvares osteotomies lumbar 2-sacrum; Left facetectomy-diskectomy lumbar 1-2; Use of Infuse bone morphogenetic protein Large kit and allograft.  -PAC referral  -Follow up with Dr. Candelaria as scheduled on 9/28/22, recommend initiating Reclast prior to surgery     The patient was seen and discussed with Dr. Thierry Jackson MD  PGY-1 Orthopaedic Surgery    Attestation:  I (Dr. Everette Guadarrama - Spine Surgeon) have personally evaluated patient with PGY-1 Renetta, and agree with findings and plan outlined in the note, which I also edited.  I discussed at length with the patient/family, explained the nature of spinal condition, and formulated workup and/or treatment plan together.  All questions were answered to the best of my ability and to patient's apparent satisfaction.    40 minutes spent on the date of the encounter doing chart review/review of outside records/review of test results/interpretation of tests/patient visit/documentation/discussion with other provider(s)/discussion with patient and family.

## 2022-09-01 NOTE — NURSING NOTE
Teaching Flowsheet   Relevant Diagnosis: T10 to Pelvis fusion  Teaching Topic: Pre and post op teaching     Person(s) involved in teaching:   Patient and Best Friend Crystal     Motivation Level:  Asks Questions: Yes  Eager to Learn: Yes  Cooperative: Yes  Receptive (willing/able to accept information): Yes  Any cultural factors/[employer] beliefs that may influence understanding or compliance? No       Patient demonstrates understanding of the following:  Reason for the appointment, diagnosis and treatment plan: Yes  Knowledge of proper use of medications and conditions for which they are ordered (with special attention to potential side effects or drug interactions): Yes  Which situations necessitate calling provider and whom to contact: Yes       Teaching Concerns Addressed: RN discussed all aspects of pre and post surgery with patient and her best friend Crystal. Paty will call patient with surgery date, PAC appt and Covid 19 appt.        Proper use and care of meds (medical equip, care aids, etc.): Yes  Nutritional needs and diet plan: Yes  Pain management techniques: Yes  Wound Care: Yes  How and/when to access community resources: Yes     Instructional Materials Used/Given: Pre op packet and antibacterial soap.     Time spent with patient: 15 minutes.

## 2022-09-06 NOTE — TELEPHONE ENCOUNTER
Reminder sent to pt to have new lab drawn, CMP & vit D level ordered by Dr Candelaria on 8/24/22  Next appt is 9/28/22 with Dr Candelaria.    Cyn Dalton RN on 9/6/2022 at 11:54 AM

## 2022-09-14 ENCOUNTER — TELEPHONE (OUTPATIENT)
Dept: ORTHOPEDICS | Facility: CLINIC | Age: 57
End: 2022-09-14

## 2022-09-14 NOTE — TELEPHONE ENCOUNTER
Phoned patient to get her schedule for surgery with Dr. Guadarrama.     Patient was given dates that work for Dr. Guadarrama & Dr. Doherty, who will be assisting Dr. Guadarrama.     Patient stated that she will talk to her spouse and will call back once they have decided on the date.     Patient had no further questions or concerns at the moment.

## 2022-09-14 NOTE — TELEPHONE ENCOUNTER
Received incoming call from patient. She stated that currently, her spouse does not know if he will be able to get time off work--if surgery was on 11/11/2022.    Provided additionally date that works for both Dr. Guadarrama and Dr. Doherty to jeannette.     Patient asked if we could hold the date of 12/16/22 at the moment.   Patient explained that her spouse works in construction and will know if they'll be able to finish the contract that he has by the end of September.     I explained to the patient that we can hold the date to an extend.   If a different patient requests that day, we may not be able to hold it any more and will be offer to the other patient who is ready to scheduled.      Patient understood and will call back once her spouse has confirmed that he'll be done with his current contract.     Patient will call when she is ready to schedule.     Patient had no further questions or concerns at the moment.

## 2022-09-16 ENCOUNTER — LAB (OUTPATIENT)
Dept: LAB | Facility: CLINIC | Age: 57
End: 2022-09-16
Payer: COMMERCIAL

## 2022-09-16 DIAGNOSIS — M81.8 OTHER OSTEOPOROSIS WITHOUT CURRENT PATHOLOGICAL FRACTURE: ICD-10-CM

## 2022-09-16 LAB
ALBUMIN SERPL-MCNC: 4.2 G/DL (ref 3.4–5)
ALP SERPL-CCNC: 80 U/L (ref 40–150)
ALT SERPL W P-5'-P-CCNC: 35 U/L (ref 0–50)
ANION GAP SERPL CALCULATED.3IONS-SCNC: 7 MMOL/L (ref 3–14)
AST SERPL W P-5'-P-CCNC: 20 U/L (ref 0–45)
BILIRUB SERPL-MCNC: 0.6 MG/DL (ref 0.2–1.3)
BUN SERPL-MCNC: 18 MG/DL (ref 7–30)
CALCIUM SERPL-MCNC: 10 MG/DL (ref 8.5–10.1)
CHLORIDE BLD-SCNC: 104 MMOL/L (ref 94–109)
CO2 SERPL-SCNC: 26 MMOL/L (ref 20–32)
CREAT SERPL-MCNC: 0.62 MG/DL (ref 0.52–1.04)
GFR SERPL CREATININE-BSD FRML MDRD: >90 ML/MIN/1.73M2
GLUCOSE BLD-MCNC: 172 MG/DL (ref 70–99)
POTASSIUM BLD-SCNC: 4.5 MMOL/L (ref 3.4–5.3)
PROT SERPL-MCNC: 7.6 G/DL (ref 6.8–8.8)
SODIUM SERPL-SCNC: 137 MMOL/L (ref 133–144)

## 2022-09-16 PROCEDURE — 82306 VITAMIN D 25 HYDROXY: CPT

## 2022-09-16 PROCEDURE — 36415 COLL VENOUS BLD VENIPUNCTURE: CPT

## 2022-09-16 PROCEDURE — 80053 COMPREHEN METABOLIC PANEL: CPT

## 2022-09-17 LAB — DEPRECATED CALCIDIOL+CALCIFEROL SERPL-MC: 70 UG/L (ref 20–75)

## 2022-09-26 ENCOUNTER — TELEPHONE (OUTPATIENT)
Dept: ORTHOPEDICS | Facility: CLINIC | Age: 57
End: 2022-09-26

## 2022-09-26 NOTE — TELEPHONE ENCOUNTER
Received voicemail from patient who wants her surgery with Dr. Guadarrama & Dr. Doherty (combo) cancelled.    Patient explained that Dr. Guadarrama was originally a second opinion and will be having surgery with her first consult provider.     Surgery has been cancelled.     No other questions or concerns.

## 2022-09-27 ASSESSMENT — ANXIETY QUESTIONNAIRES
5. BEING SO RESTLESS THAT IT IS HARD TO SIT STILL: NOT AT ALL
7. FEELING AFRAID AS IF SOMETHING AWFUL MIGHT HAPPEN: NOT AT ALL
3. WORRYING TOO MUCH ABOUT DIFFERENT THINGS: NOT AT ALL
1. FEELING NERVOUS, ANXIOUS, OR ON EDGE: SEVERAL DAYS
4. TROUBLE RELAXING: SEVERAL DAYS
7. FEELING AFRAID AS IF SOMETHING AWFUL MIGHT HAPPEN: NOT AT ALL
8. IF YOU CHECKED OFF ANY PROBLEMS, HOW DIFFICULT HAVE THESE MADE IT FOR YOU TO DO YOUR WORK, TAKE CARE OF THINGS AT HOME, OR GET ALONG WITH OTHER PEOPLE?: NOT DIFFICULT AT ALL
2. NOT BEING ABLE TO STOP OR CONTROL WORRYING: SEVERAL DAYS
GAD7 TOTAL SCORE: 3
GAD7 TOTAL SCORE: 3
IF YOU CHECKED OFF ANY PROBLEMS ON THIS QUESTIONNAIRE, HOW DIFFICULT HAVE THESE PROBLEMS MADE IT FOR YOU TO DO YOUR WORK, TAKE CARE OF THINGS AT HOME, OR GET ALONG WITH OTHER PEOPLE: NOT DIFFICULT AT ALL
6. BECOMING EASILY ANNOYED OR IRRITABLE: NOT AT ALL
GAD7 TOTAL SCORE: 3

## 2022-09-27 ASSESSMENT — PATIENT HEALTH QUESTIONNAIRE - PHQ9
10. IF YOU CHECKED OFF ANY PROBLEMS, HOW DIFFICULT HAVE THESE PROBLEMS MADE IT FOR YOU TO DO YOUR WORK, TAKE CARE OF THINGS AT HOME, OR GET ALONG WITH OTHER PEOPLE: SOMEWHAT DIFFICULT
SUM OF ALL RESPONSES TO PHQ QUESTIONS 1-9: 2
SUM OF ALL RESPONSES TO PHQ QUESTIONS 1-9: 2

## 2022-09-28 ENCOUNTER — VIRTUAL VISIT (OUTPATIENT)
Dept: PHYSICAL MEDICINE AND REHAB | Facility: CLINIC | Age: 57
End: 2022-09-28
Payer: COMMERCIAL

## 2022-09-28 DIAGNOSIS — M81.8 OTHER OSTEOPOROSIS WITHOUT CURRENT PATHOLOGICAL FRACTURE: Primary | ICD-10-CM

## 2022-09-28 PROCEDURE — 99213 OFFICE O/P EST LOW 20 MIN: CPT | Mod: 95 | Performed by: PHYSICAL MEDICINE & REHABILITATION

## 2022-09-28 NOTE — PROGRESS NOTES
Joan is a 57 year old who is being evaluated via a billable telephone visit.      What phone number would you like to be contacted at? [phone]  How would you like to obtain your AVS? MyChart    Answers for HPI/ROS submitted by the patient on 9/27/2022  If you checked off any problems, how difficult have these problems made it for you to do your work, take care of things at home, or get along with other people?: Somewhat difficult  PHQ9 TOTAL SCORE: 2  VIPUL 7 TOTAL SCORE: 3      [facility]   PM&R clinic note        Interval history:     Joan Stallworth presents to clinic today for follow up.  Since her last visit she has received and reviewed patient handouts for osteoanabolics and anti-resorptives.  She is very concerned with side effects or intolerance and has decided to address her bone health via homeopathic approaches.       Medications:  No current outpatient medications on file.              Physical Exam:   There were no vitals taken for this visit.  Gen: NAD, pleasant and cooperative     Labs/Imaging:  No results found for: WBC, HGB, HCT, MCV, PLT  Lab Results   Component Value Date     09/16/2022    POTASSIUM 4.5 09/16/2022    CHLORIDE 104 09/16/2022    CO2 26 09/16/2022     (H) 09/16/2022     Lab Results   Component Value Date    GFRESTIMATED >90 09/16/2022     Lab Results   Component Value Date    AST 20 09/16/2022    ALT 35 09/16/2022    ALKPHOS 80 09/16/2022    BILITOTAL 0.6 09/16/2022     No results found for: INR  Lab Results   Component Value Date    BUN 18 09/16/2022    CR 0.62 09/16/2022         Component Ref Range & Units 12 d ago 1 mo ago    Vitamin D, Total (25-Hydroxy) 20 - 75 ug/L 70  79 High     Resulting Agency  [facility]             Narrative              Component Ref Range & Units 12 d ago 1 mo ago    Sodium 133 - 144 mmol/L 137  138     Potassium 3.4 - 5.3 mmol/L 4.5  3.8     Chloride 94 - 109 mmol/L 104  105     Carbon Dioxide (CO2)  20 - 32 mmol/L 26  25     Anion Gap 3 - 14 mmol/L 7  8     Urea Nitrogen 7 - 30 mg/dL 18  15     Creatinine 0.52 - 1.04 mg/dL 0.62  0.59     Calcium 8.5 - 10.1 mg/dL 10.0  9.0     Glucose 70 - 99 mg/dL 172 High   180 High      Alkaline Phosphatase 40 - 150 U/L 80  107     AST 0 - 45 U/L 20  34     ALT 0 - 50 U/L 35  60 High      Protein Total 6.8 - 8.8 g/dL 7.6  7.3     Albumin 3.4 - 5.0 g/dL 4.2  4.0     Bilirubin Total 0.2 - 1.3 mg/dL 0.6  0.6     GFR Estimate >60 mL/min/1.73m2 >90  >90 CM          Component Ref Range & Units 1 mo ago     C-Telopept B-X-Linked pg/mL 331    Comment:    Premenopausal Females: 136-689 pg/mL            Component Ref Range & Units 1 mo ago     Parathyroid Hormone Intact 15 - 65 pg/mL 47    Resulting Agency  [facility]           Component Ref Range & Units 1 mo ago     Osteocalcin by [facility]IA 8 - 36 ng/mL 18    Comment: INTERPRETIVE INFORMATION: Osteocalcin by [facility]IA     In patients with renal failure, the osteocalcin result may   be directly elevated, due to impaired clearance, and/or   indirectly elevated due to renal osteodystrophy.     Access complete set of age- and/or gender-specific   reference intervals for this test in the [facility]   Test Directory ([email or web address]).   Performed By: [facility]   [address] [place] [id number]   : Everette Saucedo MD, PhD                   Assessment/Plan     58 YO female with OP based on fracture history, at risk for future fracture and surgical hardware AE.  She has declined pharmacological intervention.  RTC 2 weeks post surgery to assess functional status.  She is in agreement with this plan.     Eve Candelaria, DO    Physical Medicine & Rehabilitation      I spent a total of 7 minutes with Joan Stallworth during today's phone visit.   14 minutes spent on the date of the encounter doing chart review, history and exam, documentation and further activities as noted above

## 2022-09-28 NOTE — LETTER
9/28/2022       RE: Joan Stallworth  [address]  [place] [address]     Dear Colleague,    Thank you for referring your patient, Joan Stallworth, to the [facility]IC [place] [facility]. Please see a copy of my visit note below.      Answers for HPI/ROS submitted by the patient on 9/27/2022  If you checked off any problems, how difficult have these problems made it for you to do your work, take care of things at home, or get along with other people?: Somewhat difficult  PHQ9 TOTAL SCORE: 2  VIPUL 7 TOTAL SCORE: 3      [facility]   PM&R clinic note        Interval history:     Joan Stallworth presents to clinic today for follow up.  Since her last visit she has received and reviewed patient handouts for osteoanabolics and anti-resorptives.  She is very concerned with side effects or intolerance and has decided to address her bone health via homeopathic approaches.       Medications:  No current outpatient medications on file.              Physical Exam:   There were no vitals taken for this visit.  Gen: NAD, pleasant and cooperative     Labs/Imaging:  No results found for: WBC, HGB, HCT, MCV, PLT  Lab Results   Component Value Date     09/16/2022    POTASSIUM 4.5 09/16/2022    CHLORIDE 104 09/16/2022    CO2 26 09/16/2022     (H) 09/16/2022     Lab Results   Component Value Date    GFRESTIMATED >90 09/16/2022     Lab Results   Component Value Date    AST 20 09/16/2022    ALT 35 09/16/2022    ALKPHOS 80 09/16/2022    BILITOTAL 0.6 09/16/2022     No results found for: INR  Lab Results   Component Value Date    BUN 18 09/16/2022    CR 0.62 09/16/2022         Component Ref Range & Units 12 d ago 1 mo ago    Vitamin D, Total (25-Hydroxy) 20 - 75 ug/L 70  79 High     Resulting Agency  [facility]             Narrative              Component Ref Range & Units 12 d ago 1  mo ago    Sodium 133 - 144 mmol/L 137  138     Potassium 3.4 - 5.3 mmol/L 4.5  3.8     Chloride 94 - 109 mmol/L 104  105     Carbon Dioxide (CO2) 20 - 32 mmol/L 26  25     Anion Gap 3 - 14 mmol/L 7  8     Urea Nitrogen 7 - 30 mg/dL 18  15     Creatinine 0.52 - 1.04 mg/dL 0.62  0.59     Calcium 8.5 - 10.1 mg/dL 10.0  9.0     Glucose 70 - 99 mg/dL 172 High   180 High      Alkaline Phosphatase 40 - 150 U/L 80  107     AST 0 - 45 U/L 20  34     ALT 0 - 50 U/L 35  60 High      Protein Total 6.8 - 8.8 g/dL 7.6  7.3     Albumin 3.4 - 5.0 g/dL 4.2  4.0     Bilirubin Total 0.2 - 1.3 mg/dL 0.6  0.6     GFR Estimate >60 mL/min/1.73m2 >90  >90 CM          Component Ref Range & Units 1 mo ago     C-Telopept B-X-Linked pg/mL 331    Comment:    Premenopausal Females: 136-689 pg/mL            Component Ref Range & Units 1 mo ago     Parathyroid Hormone Intact 15 - 65 pg/mL 47    Resulting Agency  [facility]           Component Ref Range & Units 1 mo ago     Osteocalcin by [facility] 8 - 36 ng/mL 18    Comment: INTERPRETIVE INFORMATION: Osteocalcin by [facility]     In patients with renal failure, the osteocalcin result may   be directly elevated, due to impaired clearance, and/or   indirectly elevated due to renal osteodystrophy.     Access complete set of age- and/or gender-specific   reference intervals for this test in the [facility]   Test Directory ([email or web address]).   Performed By: [facility]   [address] [place] [id number]   : Everette Saucedo MD, PhD                   Assessment/Plan     56 YO female with OP based on fracture history, at risk for future fracture and surgical hardware AE.  She has declined pharmacological intervention.  RTC 2 weeks post surgery to assess functional status.  She is in agreement with this plan.       Eve Candelaria, DO  Physical Medicine & Rehabilitation      I spent a total of 7 minutes with Joan Stallworth during today's phone visit.   14 minutes spent on the date of  the encounter doing chart review, history and exam, documentation and further activities as noted above

## 2022-10-09 ENCOUNTER — HEALTH MAINTENANCE LETTER (OUTPATIENT)
Age: 57
End: 2022-10-09

## 2022-11-14 NOTE — ANESTHESIA CARE TRANSFER NOTE
Patient: Joan Stallworth    Procedure: Procedure(s):  ANESTHESIA OUT OF OR Magnetic resonance imaging lumbar and thoracic spine without contrast @1100       Diagnosis: Spondylosis of lumbar spine [M47.816]  Diagnosis Additional Information: No value filed.    Anesthesia Type:   General     Note:    Oropharynx: oropharynx clear of all foreign objects and spontaneously breathing  Level of Consciousness: awake  Oxygen Supplementation: face mask  Level of Supplemental Oxygen (L/min / FiO2): 6  Independent Airway: airway patency satisfactory and stable  Dentition: dentition unchanged  Vital Signs Stable: post-procedure vital signs reviewed and stable  Report to RN Given: handoff report given  Patient transferred to: PACU  Comments: Extubated in PACU.  Handoff Report: Identifed the Patient, Identified the Reponsible Provider, Reviewed the pertinent medical history, Discussed the surgical course, Reviewed Intra-OP anesthesia mangement and issues during anesthesia, Set expectations for post-procedure period and Allowed opportunity for questions and acknowledgement of understanding      Vitals:  Vitals Value Taken Time   /71 08/19/22 1254   Temp 36.8  C (98.2  F) 08/19/22 1242   Pulse 77 08/19/22 1255   Resp 9 08/19/22 1245   SpO2 100 % 08/19/22 1255   Vitals shown include unvalidated device data.    Electronically Signed By: German Fernandez MD  August 19, 2022  12:57 PM  
.

## 2022-12-19 ENCOUNTER — TRANSFERRED RECORDS (OUTPATIENT)
Dept: HEALTH INFORMATION MANAGEMENT | Facility: CLINIC | Age: 57
End: 2022-12-19

## 2022-12-26 RX ORDER — AZELASTINE 1 MG/ML
2 SPRAY, METERED NASAL 2 TIMES DAILY PRN
COMMUNITY
Start: 2022-02-01

## 2022-12-26 RX ORDER — CHOLECALCIFEROL (VITAMIN D3) 10(400)/ML
DROPS ORAL DAILY
COMMUNITY

## 2022-12-26 RX ORDER — ALBUTEROL SULFATE 90 UG/1
AEROSOL, METERED RESPIRATORY (INHALATION) EVERY 4 HOURS PRN
COMMUNITY

## 2023-01-19 ENCOUNTER — ANESTHESIA EVENT (OUTPATIENT)
Dept: SURGERY | Facility: CLINIC | Age: 58
End: 2023-01-19
Payer: COMMERCIAL

## 2023-01-19 ENCOUNTER — APPOINTMENT (OUTPATIENT)
Dept: GENERAL RADIOLOGY | Facility: CLINIC | Age: 58
End: 2023-01-19
Attending: NEUROLOGICAL SURGERY
Payer: COMMERCIAL

## 2023-01-19 ENCOUNTER — ANESTHESIA (OUTPATIENT)
Dept: SURGERY | Facility: CLINIC | Age: 58
End: 2023-01-19
Payer: COMMERCIAL

## 2023-01-19 ENCOUNTER — HOSPITAL ENCOUNTER (INPATIENT)
Facility: CLINIC | Age: 58
LOS: 1 days | Discharge: HOME | End: 2023-01-20
Attending: NEUROLOGICAL SURGERY | Admitting: NEUROLOGICAL SURGERY
Payer: COMMERCIAL

## 2023-01-19 DIAGNOSIS — M43.26 FUSION OF LUMBAR SPINE: Primary | ICD-10-CM

## 2023-01-19 PROBLEM — M43.20 FUSION OF SPINE, SITE UNSPECIFIED: Status: ACTIVE | Noted: 2023-01-19

## 2023-01-19 LAB
GLUCOSE BLDC GLUCOMTR-MCNC: 152 MG/DL (ref 70–99)
GLUCOSE BLDC GLUCOMTR-MCNC: 159 MG/DL (ref 70–99)

## 2023-01-19 PROCEDURE — 250N000009 HC RX 250: Performed by: NEUROLOGICAL SURGERY

## 2023-01-19 PROCEDURE — 0SB20ZZ EXCISION OF LUMBAR VERTEBRAL DISC, OPEN APPROACH: ICD-10-PCS | Performed by: NEUROLOGICAL SURGERY

## 2023-01-19 PROCEDURE — 999N000179 XR SURGERY CARM FLUORO LESS THAN 5 MIN W STILLS: Mod: TC

## 2023-01-19 PROCEDURE — 250N000009 HC RX 250: Performed by: NURSE ANESTHETIST, CERTIFIED REGISTERED

## 2023-01-19 PROCEDURE — 272N000282 HC OR IOM SUPPLIES OPNP: Performed by: NEUROLOGICAL SURGERY

## 2023-01-19 PROCEDURE — 272N000001 HC OR GENERAL SUPPLY STERILE: Performed by: NEUROLOGICAL SURGERY

## 2023-01-19 PROCEDURE — 370N000017 HC ANESTHESIA TECHNICAL FEE, PER MIN: Performed by: NEUROLOGICAL SURGERY

## 2023-01-19 PROCEDURE — 999N000141 HC STATISTIC PRE-PROCEDURE NURSING ASSESSMENT: Performed by: NEUROLOGICAL SURGERY

## 2023-01-19 PROCEDURE — 120N000001 HC R&B MED SURG/OB

## 2023-01-19 PROCEDURE — 360N000084 HC SURGERY LEVEL 4 W/ FLUORO, PER MIN: Performed by: NEUROLOGICAL SURGERY

## 2023-01-19 PROCEDURE — 250N000025 HC SEVOFLURANE, PER MIN: Performed by: NEUROLOGICAL SURGERY

## 2023-01-19 PROCEDURE — 250N000011 HC RX IP 250 OP 636: Performed by: NEUROLOGICAL SURGERY

## 2023-01-19 PROCEDURE — 710N000009 HC RECOVERY PHASE 1, LEVEL 1, PER MIN: Performed by: NEUROLOGICAL SURGERY

## 2023-01-19 PROCEDURE — 258N000003 HC RX IP 258 OP 636: Performed by: ANESTHESIOLOGY

## 2023-01-19 PROCEDURE — 250N000009 HC RX 250: Performed by: ANESTHESIOLOGY

## 2023-01-19 PROCEDURE — 250N000011 HC RX IP 250 OP 636: Performed by: NURSE ANESTHETIST, CERTIFIED REGISTERED

## 2023-01-19 PROCEDURE — C1713 ANCHOR/SCREW BN/BN,TIS/BN: HCPCS | Performed by: NEUROLOGICAL SURGERY

## 2023-01-19 PROCEDURE — 258N000003 HC RX IP 258 OP 636: Performed by: NEUROLOGICAL SURGERY

## 2023-01-19 PROCEDURE — 4A1134G MONITORING OF PERIPHERAL NERVOUS ELECTRICAL ACTIVITY, INTRAOPERATIVE, PERCUTANEOUS APPROACH: ICD-10-PCS | Performed by: NEUROLOGICAL SURGERY

## 2023-01-19 PROCEDURE — 250N000011 HC RX IP 250 OP 636: Performed by: ANESTHESIOLOGY

## 2023-01-19 PROCEDURE — 3E0R33Z INTRODUCTION OF ANTI-INFLAMMATORY INTO SPINAL CANAL, PERCUTANEOUS APPROACH: ICD-10-PCS | Performed by: NEUROLOGICAL SURGERY

## 2023-01-19 PROCEDURE — 99255 IP/OBS CONSLTJ NEW/EST HI 80: CPT | Performed by: NURSE PRACTITIONER

## 2023-01-19 PROCEDURE — 250N000013 HC RX MED GY IP 250 OP 250 PS 637: Performed by: NEUROLOGICAL SURGERY

## 2023-01-19 PROCEDURE — 250N000011 HC RX IP 250 OP 636: Performed by: NURSE PRACTITIONER

## 2023-01-19 PROCEDURE — 07DS3ZZ EXTRACTION OF VERTEBRAL BONE MARROW, PERCUTANEOUS APPROACH: ICD-10-PCS | Performed by: NEUROLOGICAL SURGERY

## 2023-01-19 PROCEDURE — 250N000013 HC RX MED GY IP 250 OP 250 PS 637: Performed by: ANESTHESIOLOGY

## 2023-01-19 PROCEDURE — 0SG10A0 FUSION OF 2 OR MORE LUMBAR VERTEBRAL JOINTS WITH INTERBODY FUSION DEVICE, ANTERIOR APPROACH, ANTERIOR COLUMN, OPEN APPROACH: ICD-10-PCS | Performed by: NEUROLOGICAL SURGERY

## 2023-01-19 PROCEDURE — 258N000003 HC RX IP 258 OP 636: Performed by: NURSE ANESTHETIST, CERTIFIED REGISTERED

## 2023-01-19 PROCEDURE — 272N000002 HC OR SUPPLY OTHER OPNP: Performed by: NEUROLOGICAL SURGERY

## 2023-01-19 PROCEDURE — 01NB0ZZ RELEASE LUMBAR NERVE, OPEN APPROACH: ICD-10-PCS | Performed by: NEUROLOGICAL SURGERY

## 2023-01-19 PROCEDURE — 0SG1071 FUSION OF 2 OR MORE LUMBAR VERTEBRAL JOINTS WITH AUTOLOGOUS TISSUE SUBSTITUTE, POSTERIOR APPROACH, POSTERIOR COLUMN, OPEN APPROACH: ICD-10-PCS | Performed by: NEUROLOGICAL SURGERY

## 2023-01-19 DEVICE — IMPLANTABLE DEVICE: Type: IMPLANTABLE DEVICE | Site: SPINE LUMBAR | Status: FUNCTIONAL

## 2023-01-19 DEVICE — SCREW SET SPNE STAR OPN LNK PATHLOC-L STRL LF: Type: IMPLANTABLE DEVICE | Site: SPINE LUMBAR | Status: FUNCTIONAL

## 2023-01-19 RX ORDER — KETOROLAC TROMETHAMINE 30 MG/ML
INJECTION, SOLUTION INTRAMUSCULAR; INTRAVENOUS PRN
Status: DISCONTINUED | OUTPATIENT
Start: 2023-01-19 | End: 2023-01-19

## 2023-01-19 RX ORDER — LIDOCAINE 40 MG/G
CREAM TOPICAL
Status: DISCONTINUED | OUTPATIENT
Start: 2023-01-19 | End: 2023-01-19 | Stop reason: HOSPADM

## 2023-01-19 RX ORDER — ALBUTEROL SULFATE 0.83 MG/ML
2.5 SOLUTION RESPIRATORY (INHALATION) ONCE
Status: COMPLETED | OUTPATIENT
Start: 2023-01-19 | End: 2023-01-19

## 2023-01-19 RX ORDER — SODIUM CHLORIDE, SODIUM LACTATE, POTASSIUM CHLORIDE, CALCIUM CHLORIDE 600; 310; 30; 20 MG/100ML; MG/100ML; MG/100ML; MG/100ML
INJECTION, SOLUTION INTRAVENOUS CONTINUOUS
Status: DISCONTINUED | OUTPATIENT
Start: 2023-01-19 | End: 2023-01-19 | Stop reason: HOSPADM

## 2023-01-19 RX ORDER — CEFAZOLIN SODIUM 2 G/100ML
2 INJECTION, SOLUTION INTRAVENOUS EVERY 8 HOURS
Status: COMPLETED | OUTPATIENT
Start: 2023-01-19 | End: 2023-01-20

## 2023-01-19 RX ORDER — ACETAMINOPHEN 325 MG/1
650 TABLET ORAL EVERY 4 HOURS PRN
Status: DISCONTINUED | OUTPATIENT
Start: 2023-01-22 | End: 2023-01-20 | Stop reason: HOSPADM

## 2023-01-19 RX ORDER — TRANEXAMIC ACID 100 MG/ML
INJECTION, SOLUTION INTRAVENOUS PRN
Status: DISCONTINUED | OUTPATIENT
Start: 2023-01-19 | End: 2023-01-19 | Stop reason: HOSPADM

## 2023-01-19 RX ORDER — FENTANYL CITRATE 50 UG/ML
25 INJECTION, SOLUTION INTRAMUSCULAR; INTRAVENOUS EVERY 5 MIN PRN
Status: DISCONTINUED | OUTPATIENT
Start: 2023-01-19 | End: 2023-01-19 | Stop reason: HOSPADM

## 2023-01-19 RX ORDER — ALBUTEROL SULFATE 90 UG/1
2 AEROSOL, METERED RESPIRATORY (INHALATION)
Status: DISCONTINUED | OUTPATIENT
Start: 2023-01-19 | End: 2023-01-20 | Stop reason: HOSPADM

## 2023-01-19 RX ORDER — LIDOCAINE 40 MG/G
CREAM TOPICAL
Status: DISCONTINUED | OUTPATIENT
Start: 2023-01-19 | End: 2023-01-20 | Stop reason: HOSPADM

## 2023-01-19 RX ORDER — METHADONE HYDROCHLORIDE 10 MG/ML
2 INJECTION, SOLUTION INTRAMUSCULAR; INTRAVENOUS; SUBCUTANEOUS 3 TIMES DAILY PRN
Status: DISCONTINUED | OUTPATIENT
Start: 2023-01-19 | End: 2023-01-19

## 2023-01-19 RX ORDER — BUPIVACAINE HYDROCHLORIDE 7.5 MG/ML
INJECTION, SOLUTION EPIDURAL; RETROBULBAR PRN
Status: DISCONTINUED | OUTPATIENT
Start: 2023-01-19 | End: 2023-01-19 | Stop reason: HOSPADM

## 2023-01-19 RX ORDER — DEXAMETHASONE SODIUM PHOSPHATE 4 MG/ML
INJECTION, SOLUTION INTRA-ARTICULAR; INTRALESIONAL; INTRAMUSCULAR; INTRAVENOUS; SOFT TISSUE PRN
Status: DISCONTINUED | OUTPATIENT
Start: 2023-01-19 | End: 2023-01-19

## 2023-01-19 RX ORDER — ONDANSETRON 2 MG/ML
INJECTION INTRAMUSCULAR; INTRAVENOUS PRN
Status: DISCONTINUED | OUTPATIENT
Start: 2023-01-19 | End: 2023-01-19

## 2023-01-19 RX ORDER — OXYCODONE HYDROCHLORIDE 10 MG/1
10 TABLET ORAL
Status: DISCONTINUED | OUTPATIENT
Start: 2023-01-19 | End: 2023-01-20 | Stop reason: HOSPADM

## 2023-01-19 RX ORDER — OXYCODONE HYDROCHLORIDE 10 MG/1
10 TABLET ORAL EVERY 4 HOURS PRN
Status: DISCONTINUED | OUTPATIENT
Start: 2023-01-19 | End: 2023-01-19

## 2023-01-19 RX ORDER — ONDANSETRON 2 MG/ML
4 INJECTION INTRAMUSCULAR; INTRAVENOUS EVERY 30 MIN PRN
Status: DISCONTINUED | OUTPATIENT
Start: 2023-01-19 | End: 2023-01-19 | Stop reason: HOSPADM

## 2023-01-19 RX ORDER — PROPOFOL 10 MG/ML
INJECTION, EMULSION INTRAVENOUS PRN
Status: DISCONTINUED | OUTPATIENT
Start: 2023-01-19 | End: 2023-01-19

## 2023-01-19 RX ORDER — NALOXONE HYDROCHLORIDE 0.4 MG/ML
0.4 INJECTION, SOLUTION INTRAMUSCULAR; INTRAVENOUS; SUBCUTANEOUS
Status: DISCONTINUED | OUTPATIENT
Start: 2023-01-19 | End: 2023-01-20 | Stop reason: HOSPADM

## 2023-01-19 RX ORDER — BISACODYL 10 MG
10 SUPPOSITORY, RECTAL RECTAL DAILY PRN
Status: DISCONTINUED | OUTPATIENT
Start: 2023-01-19 | End: 2023-01-20 | Stop reason: HOSPADM

## 2023-01-19 RX ORDER — PHENYLEPHRINE HYDROCHLORIDE 10 MG/ML
INJECTION INTRAVENOUS PRN
Status: DISCONTINUED | OUTPATIENT
Start: 2023-01-19 | End: 2023-01-19

## 2023-01-19 RX ORDER — NALOXONE HYDROCHLORIDE 0.4 MG/ML
0.2 INJECTION, SOLUTION INTRAMUSCULAR; INTRAVENOUS; SUBCUTANEOUS
Status: DISCONTINUED | OUTPATIENT
Start: 2023-01-19 | End: 2023-01-20 | Stop reason: HOSPADM

## 2023-01-19 RX ORDER — OXYCODONE HYDROCHLORIDE 5 MG/1
5 TABLET ORAL
Status: DISCONTINUED | OUTPATIENT
Start: 2023-01-19 | End: 2023-01-20 | Stop reason: HOSPADM

## 2023-01-19 RX ORDER — ACETAMINOPHEN 325 MG/1
975 TABLET ORAL ONCE
Status: COMPLETED | OUTPATIENT
Start: 2023-01-19 | End: 2023-01-19

## 2023-01-19 RX ORDER — METHOCARBAMOL 500 MG/1
500 TABLET, FILM COATED ORAL EVERY 6 HOURS
Qty: 60 TABLET | Refills: 3 | Status: SHIPPED | OUTPATIENT
Start: 2023-01-19 | End: 2024-09-09

## 2023-01-19 RX ORDER — SODIUM CHLORIDE, SODIUM LACTATE, POTASSIUM CHLORIDE, CALCIUM CHLORIDE 600; 310; 30; 20 MG/100ML; MG/100ML; MG/100ML; MG/100ML
INJECTION, SOLUTION INTRAVENOUS CONTINUOUS PRN
Status: DISCONTINUED | OUTPATIENT
Start: 2023-01-19 | End: 2023-01-19

## 2023-01-19 RX ORDER — LIDOCAINE HYDROCHLORIDE 10 MG/ML
INJECTION, SOLUTION INFILTRATION; PERINEURAL PRN
Status: DISCONTINUED | OUTPATIENT
Start: 2023-01-19 | End: 2023-01-19

## 2023-01-19 RX ORDER — OXYCODONE HYDROCHLORIDE 10 MG/1
10 TABLET ORAL EVERY 4 HOURS PRN
Qty: 60 TABLET | Refills: 0 | Status: SHIPPED | OUTPATIENT
Start: 2023-01-19 | End: 2024-09-09

## 2023-01-19 RX ORDER — METHADONE HYDROCHLORIDE 10 MG/ML
INJECTION, SOLUTION INTRAMUSCULAR; INTRAVENOUS; SUBCUTANEOUS PRN
Status: DISCONTINUED | OUTPATIENT
Start: 2023-01-19 | End: 2023-01-19

## 2023-01-19 RX ORDER — KETOROLAC TROMETHAMINE 30 MG/ML
15 INJECTION, SOLUTION INTRAMUSCULAR; INTRAVENOUS
Status: DISCONTINUED | OUTPATIENT
Start: 2023-01-19 | End: 2023-01-19 | Stop reason: HOSPADM

## 2023-01-19 RX ORDER — ONDANSETRON 2 MG/ML
4 INJECTION INTRAMUSCULAR; INTRAVENOUS EVERY 6 HOURS PRN
Status: DISCONTINUED | OUTPATIENT
Start: 2023-01-19 | End: 2023-01-20 | Stop reason: HOSPADM

## 2023-01-19 RX ORDER — HYDROXYZINE HYDROCHLORIDE 25 MG/1
25 TABLET, FILM COATED ORAL EVERY 6 HOURS
Status: DISCONTINUED | OUTPATIENT
Start: 2023-01-19 | End: 2023-01-20 | Stop reason: HOSPADM

## 2023-01-19 RX ORDER — PROCHLORPERAZINE MALEATE 10 MG
10 TABLET ORAL EVERY 6 HOURS PRN
Status: DISCONTINUED | OUTPATIENT
Start: 2023-01-19 | End: 2023-01-20 | Stop reason: HOSPADM

## 2023-01-19 RX ORDER — LABETALOL HYDROCHLORIDE 5 MG/ML
10 INJECTION, SOLUTION INTRAVENOUS
Status: DISCONTINUED | OUTPATIENT
Start: 2023-01-19 | End: 2023-01-19 | Stop reason: HOSPADM

## 2023-01-19 RX ORDER — CEFAZOLIN SODIUM/WATER 2 G/20 ML
2 SYRINGE (ML) INTRAVENOUS SEE ADMIN INSTRUCTIONS
Status: DISCONTINUED | OUTPATIENT
Start: 2023-01-19 | End: 2023-01-19 | Stop reason: HOSPADM

## 2023-01-19 RX ORDER — ALBUTEROL SULFATE 0.83 MG/ML
2.5 SOLUTION RESPIRATORY (INHALATION) EVERY 4 HOURS PRN
Status: DISCONTINUED | OUTPATIENT
Start: 2023-01-19 | End: 2023-01-19 | Stop reason: HOSPADM

## 2023-01-19 RX ORDER — HYDROMORPHONE HCL IN WATER/PF 6 MG/30 ML
0.4 PATIENT CONTROLLED ANALGESIA SYRINGE INTRAVENOUS
Status: DISCONTINUED | OUTPATIENT
Start: 2023-01-19 | End: 2023-01-20 | Stop reason: HOSPADM

## 2023-01-19 RX ORDER — KETAMINE HYDROCHLORIDE 10 MG/ML
INJECTION INTRAMUSCULAR; INTRAVENOUS PRN
Status: DISCONTINUED | OUTPATIENT
Start: 2023-01-19 | End: 2023-01-19

## 2023-01-19 RX ORDER — KETOROLAC TROMETHAMINE 30 MG/ML
15 INJECTION, SOLUTION INTRAMUSCULAR; INTRAVENOUS EVERY 6 HOURS PRN
Status: DISCONTINUED | OUTPATIENT
Start: 2023-01-19 | End: 2023-01-20 | Stop reason: HOSPADM

## 2023-01-19 RX ORDER — POLYETHYLENE GLYCOL 3350 17 G/17G
17 POWDER, FOR SOLUTION ORAL DAILY
Status: DISCONTINUED | OUTPATIENT
Start: 2023-01-20 | End: 2023-01-19

## 2023-01-19 RX ORDER — ACETAMINOPHEN 325 MG/1
975 TABLET ORAL EVERY 8 HOURS
Status: DISCONTINUED | OUTPATIENT
Start: 2023-01-19 | End: 2023-01-20 | Stop reason: HOSPADM

## 2023-01-19 RX ORDER — MAGNESIUM SULFATE HEPTAHYDRATE 40 MG/ML
2 INJECTION, SOLUTION INTRAVENOUS ONCE
Status: DISCONTINUED | OUTPATIENT
Start: 2023-01-19 | End: 2023-01-19 | Stop reason: HOSPADM

## 2023-01-19 RX ORDER — CEFAZOLIN SODIUM/WATER 2 G/20 ML
2 SYRINGE (ML) INTRAVENOUS
Status: COMPLETED | OUTPATIENT
Start: 2023-01-19 | End: 2023-01-19

## 2023-01-19 RX ORDER — ONDANSETRON 4 MG/1
4 TABLET, ORALLY DISINTEGRATING ORAL EVERY 30 MIN PRN
Status: DISCONTINUED | OUTPATIENT
Start: 2023-01-19 | End: 2023-01-19 | Stop reason: HOSPADM

## 2023-01-19 RX ORDER — SCOLOPAMINE TRANSDERMAL SYSTEM 1 MG/1
1 PATCH, EXTENDED RELEASE TRANSDERMAL ONCE
Status: COMPLETED | OUTPATIENT
Start: 2023-01-19 | End: 2023-01-20

## 2023-01-19 RX ORDER — GLYCOPYRROLATE 0.2 MG/ML
INJECTION, SOLUTION INTRAMUSCULAR; INTRAVENOUS PRN
Status: DISCONTINUED | OUTPATIENT
Start: 2023-01-19 | End: 2023-01-19

## 2023-01-19 RX ORDER — METHOCARBAMOL 500 MG/1
500 TABLET, FILM COATED ORAL EVERY 6 HOURS
Status: DISCONTINUED | OUTPATIENT
Start: 2023-01-19 | End: 2023-01-20 | Stop reason: HOSPADM

## 2023-01-19 RX ORDER — PROPOFOL 10 MG/ML
INJECTION, EMULSION INTRAVENOUS CONTINUOUS PRN
Status: DISCONTINUED | OUTPATIENT
Start: 2023-01-19 | End: 2023-01-19

## 2023-01-19 RX ORDER — HYDRALAZINE HYDROCHLORIDE 20 MG/ML
2.5-5 INJECTION INTRAMUSCULAR; INTRAVENOUS EVERY 10 MIN PRN
Status: DISCONTINUED | OUTPATIENT
Start: 2023-01-19 | End: 2023-01-19 | Stop reason: HOSPADM

## 2023-01-19 RX ORDER — SODIUM CHLORIDE 9 MG/ML
INJECTION, SOLUTION INTRAVENOUS CONTINUOUS
Status: DISCONTINUED | OUTPATIENT
Start: 2023-01-19 | End: 2023-01-20 | Stop reason: HOSPADM

## 2023-01-19 RX ORDER — GABAPENTIN 300 MG/1
300 CAPSULE ORAL
Status: DISCONTINUED | OUTPATIENT
Start: 2023-01-19 | End: 2023-01-19

## 2023-01-19 RX ORDER — AMOXICILLIN 250 MG
1 CAPSULE ORAL 2 TIMES DAILY
Status: DISCONTINUED | OUTPATIENT
Start: 2023-01-19 | End: 2023-01-20 | Stop reason: HOSPADM

## 2023-01-19 RX ORDER — ONDANSETRON 4 MG/1
4 TABLET, ORALLY DISINTEGRATING ORAL EVERY 6 HOURS PRN
Status: DISCONTINUED | OUTPATIENT
Start: 2023-01-19 | End: 2023-01-20 | Stop reason: HOSPADM

## 2023-01-19 RX ORDER — HYDROXYZINE HYDROCHLORIDE 25 MG/1
25 TABLET, FILM COATED ORAL EVERY 6 HOURS
Qty: 60 TABLET | Refills: 3 | Status: SHIPPED | OUTPATIENT
Start: 2023-01-19 | End: 2024-09-09

## 2023-01-19 RX ADMIN — SCOPALAMINE 1 PATCH: 1 PATCH, EXTENDED RELEASE TRANSDERMAL at 10:45

## 2023-01-19 RX ADMIN — DEXAMETHASONE SODIUM PHOSPHATE 8 MG: 4 INJECTION, SOLUTION INTRA-ARTICULAR; INTRALESIONAL; INTRAMUSCULAR; INTRAVENOUS; SOFT TISSUE at 12:10

## 2023-01-19 RX ADMIN — Medication 50 MG: at 12:16

## 2023-01-19 RX ADMIN — HYDROXYZINE HYDROCHLORIDE 25 MG: 25 TABLET ORAL at 17:46

## 2023-01-19 RX ADMIN — ALBUTEROL SULFATE 2.5 MG: 2.5 SOLUTION RESPIRATORY (INHALATION) at 10:45

## 2023-01-19 RX ADMIN — PHENYLEPHRINE HYDROCHLORIDE 100 MCG: 10 INJECTION INTRAVENOUS at 13:55

## 2023-01-19 RX ADMIN — OXYCODONE HYDROCHLORIDE 15 MG: 5 TABLET ORAL at 15:31

## 2023-01-19 RX ADMIN — MIDAZOLAM 2 MG: 1 INJECTION INTRAMUSCULAR; INTRAVENOUS at 11:58

## 2023-01-19 RX ADMIN — LIDOCAINE HYDROCHLORIDE 50 MG: 10 INJECTION, SOLUTION INFILTRATION; PERINEURAL at 12:10

## 2023-01-19 RX ADMIN — METHOCARBAMOL 500 MG: 500 TABLET ORAL at 15:31

## 2023-01-19 RX ADMIN — PHENYLEPHRINE HYDROCHLORIDE 100 MCG: 10 INJECTION INTRAVENOUS at 12:38

## 2023-01-19 RX ADMIN — PROPOFOL 50 MG: 10 INJECTION, EMULSION INTRAVENOUS at 12:16

## 2023-01-19 RX ADMIN — ONDANSETRON 4 MG: 2 INJECTION INTRAMUSCULAR; INTRAVENOUS at 14:07

## 2023-01-19 RX ADMIN — ACETAMINOPHEN 975 MG: 325 TABLET, FILM COATED ORAL at 16:29

## 2023-01-19 RX ADMIN — SODIUM CHLORIDE, POTASSIUM CHLORIDE, SODIUM LACTATE AND CALCIUM CHLORIDE: 600; 310; 30; 20 INJECTION, SOLUTION INTRAVENOUS at 15:25

## 2023-01-19 RX ADMIN — HYDROXYZINE HYDROCHLORIDE 25 MG: 25 TABLET ORAL at 23:26

## 2023-01-19 RX ADMIN — FENTANYL CITRATE 25 MCG: 50 INJECTION, SOLUTION INTRAMUSCULAR; INTRAVENOUS at 15:04

## 2023-01-19 RX ADMIN — SODIUM CHLORIDE: 9 INJECTION, SOLUTION INTRAVENOUS at 17:52

## 2023-01-19 RX ADMIN — GLYCOPYRROLATE 0.1 MG: 0.2 INJECTION, SOLUTION INTRAMUSCULAR; INTRAVENOUS at 13:35

## 2023-01-19 RX ADMIN — ONDANSETRON 4 MG: 2 INJECTION INTRAMUSCULAR; INTRAVENOUS at 15:04

## 2023-01-19 RX ADMIN — METHOCARBAMOL 500 MG: 500 TABLET ORAL at 20:34

## 2023-01-19 RX ADMIN — Medication 80 MG: at 12:10

## 2023-01-19 RX ADMIN — KETOROLAC TROMETHAMINE 15 MG: 30 INJECTION, SOLUTION INTRAMUSCULAR at 12:10

## 2023-01-19 RX ADMIN — PROPOFOL 100 MCG/KG/MIN: 10 INJECTION, EMULSION INTRAVENOUS at 12:18

## 2023-01-19 RX ADMIN — HYDROMORPHONE HYDROCHLORIDE 0.2 MG: 1 INJECTION, SOLUTION INTRAMUSCULAR; INTRAVENOUS; SUBCUTANEOUS at 17:46

## 2023-01-19 RX ADMIN — METHADONE HYDROCHLORIDE 2 MG: 10 INJECTION, SOLUTION INTRAMUSCULAR; INTRAVENOUS; SUBCUTANEOUS at 15:09

## 2023-01-19 RX ADMIN — SODIUM CHLORIDE, POTASSIUM CHLORIDE, SODIUM LACTATE AND CALCIUM CHLORIDE: 600; 310; 30; 20 INJECTION, SOLUTION INTRAVENOUS at 11:30

## 2023-01-19 RX ADMIN — METHADONE HYDROCHLORIDE 2 MG: 10 INJECTION, SOLUTION INTRAMUSCULAR; INTRAVENOUS; SUBCUTANEOUS at 15:20

## 2023-01-19 RX ADMIN — CEFAZOLIN SODIUM 2 G: 2 INJECTION, SOLUTION INTRAVENOUS at 20:35

## 2023-01-19 RX ADMIN — SODIUM CHLORIDE, POTASSIUM CHLORIDE, SODIUM LACTATE AND CALCIUM CHLORIDE: 600; 310; 30; 20 INJECTION, SOLUTION INTRAVENOUS at 12:30

## 2023-01-19 RX ADMIN — GLYCOPYRROLATE 0.1 MG: 0.2 INJECTION, SOLUTION INTRAMUSCULAR; INTRAVENOUS at 12:57

## 2023-01-19 RX ADMIN — PHENYLEPHRINE HYDROCHLORIDE 100 MCG: 10 INJECTION INTRAVENOUS at 13:36

## 2023-01-19 RX ADMIN — SENNOSIDES AND DOCUSATE SODIUM 1 TABLET: 50; 8.6 TABLET ORAL at 20:34

## 2023-01-19 RX ADMIN — Medication 2 G: at 11:59

## 2023-01-19 RX ADMIN — PHENYLEPHRINE HYDROCHLORIDE 100 MCG: 10 INJECTION INTRAVENOUS at 12:27

## 2023-01-19 RX ADMIN — PROPOFOL 120 MG: 10 INJECTION, EMULSION INTRAVENOUS at 12:10

## 2023-01-19 RX ADMIN — Medication 15 MG: at 12:10

## 2023-01-19 RX ADMIN — ACETAMINOPHEN 975 MG: 325 TABLET, FILM COATED ORAL at 23:26

## 2023-01-19 RX ADMIN — PHENYLEPHRINE HYDROCHLORIDE 100 MCG: 10 INJECTION INTRAVENOUS at 12:46

## 2023-01-19 RX ADMIN — ACETAMINOPHEN 975 MG: 325 TABLET, FILM COATED ORAL at 10:45

## 2023-01-19 ASSESSMENT — ACTIVITIES OF DAILY LIVING (ADL)
ADLS_ACUITY_SCORE: 22
ADLS_ACUITY_SCORE: 22
ADLS_ACUITY_SCORE: 26
ADLS_ACUITY_SCORE: 20
ADLS_ACUITY_SCORE: 20
ADLS_ACUITY_SCORE: 22
ADLS_ACUITY_SCORE: 26
ADLS_ACUITY_SCORE: 16

## 2023-01-19 NOTE — CONSULTS
[facility]  Pain Service Consultation   Text Page    Date of Admission:  1/19/2023    Assessment & Plan   Joan Stallworth is a 57 year old female who was admitted on 1/19/2023. I was asked by Alyse Tavares MD  to see the patient for acute post operative pain. She is s/p OLLIF,L1/2/3 with  ml .    PLAN:   1)  Opioids:  Patient opioid naive reduce Oxycodone to 5-10 mg every 3 hrs   Reduce Hydromorphone 0.2-0.4 mg every 2 hrs prn   Received intraop Methadone likelycontributing to excess sedation   Opioids Treatment Goal:   -Improvement in function  -Participate in PT  -Post-operative management of pain, expected 3-7 days needed    2)Non-opioid multimodal medication therapy  -Topical:Not indicated   -N-SAIDS:Ketorolac as ordered  -Muscle Relaxants:Methocarbamol 500 mg every 6 hrs   -Adjuvants:Hydroxyzine 25 mg tid   -Antidepresants/anxiolytics: none     3)  Non-medication interventions  Positioning, ICE, Relaxation, Meditation, Distraction, Essential oils per nursing, Physical therapy, Brace    4)  Constipation Prophylaxis    Continue to Monitor, Bowel Meds PRN per Constipation Order Set    5) Medication Risk reduction strategies   -monitor for sedation   -Capnography per protocol   -narcan for opioid reversal  -supplemental o2  P02>90, C02>29    6)  Pain Education  -Opioid safe use, storage and disposal information included in DC AVS    7)  DC Planning   Discussed goal of Opioid therapy as above with patient  Recommend short supply of opioids only (3-7days) per CDC guidelines for acute pain management.  Continued outpatient management of pain per  Surgeon   Disposition: home  Support systems:  Spouse   Outpatient Referrals:none  The following risk factors have been identified for unintentional overdose: patient is on multiple sedating medications , patient is overweight, patient has been switched to a new opioid medication and patient has high suspicion sleep disordered breathing.  Discharge with intra-nasal naloxone if discharged to home with opioids  >40 mg MME/day.  Plan for education prior to discharge.    ASSESSMENT  1)  Acute on chronic back pain s/p OLLIF L1-3 on 1/19/23.     2)  Patient with chronic  Pain NOT, on chronic opioid therapy managed by    Baseline 0mg Daily Morphine Equivalent as dispensed and as reported daily use.  Patient has no expected opioid tolerance.     Patient's opioid use in past 24 hours:  18 mg Methadone, Fentanyl 225 mcg, Oxycodone 15 mg = 238.5 mg Daily Morphine Equivalent + Fentanyl 225 mcg  Methadone nonlinear pharmacokinetic may peak unpredictably up to 59 hrs     3)  Risk factors for opioid related harms  -High opioid dose (>50 MME/day)  -Sleep disordered breathing ( suspect), no diagnosed   -Opioid has recently been changed    4)  Opioid induced side-effects:  -Constipation  None   -Nausea/Vomit  none  -Sedation  yes   -Urinary Retention  None la post op  -Respiratory Depression  None continue to montior  -Incentive spirometry     5)  Other/Related:    -Deconditioning  -obesity     Time Spent on this Encounter   Medical Decision Making:     Please see A&P for additional details of medical decision making.  NOTE(S)/MEDICAL RECORDS REVIEWED over the past 24 hours: yesm H&P, notes, labs  Tests REVIEWED in the past 24 hours:  - BMP  - CBC  Medical complexity over the past 24 hours:  - Parenteral (IV) CONTROLLED SUBSTANCES ordered  - Decision to DE-ESCALATE CARE based on prognosis  - Prescription DRUG MANAGEMENT performed   Time spend counseling with patient consisted of the following topics, symptom management, education on pain plan, medication side effects and duration of plan.  Time spent in coordination of care with Bedside Nurse Lindsey Collins RN  . Charge nurse Lulu Yu APRN CNP, PGMT-BC, ACHPN  Acute Pain Team [facility]   Office : [phone]  Pager  [phone] Wed-Thur Friday    M-T  Amcom Page Acute Pain Management Team proffered  [employer] text message/call      Reason for Consult   Reason for consult: I was asked to evaluate this patient for acute post operative pain .    Primary Care Physician   Primary Care Physician:Tamie Mixon  Pain Specialist:  None     Chief Complaint    back pain acute post operative     History is obtained from the patient, electronic health record, and patient's spouse    History of Present Illness   Joan Stallworth is a 57 year old pleasant female who presents with a history of poorly controlled back pain over the last 2 years. She has failed conservative treatment. She has past hx of obesity, diabetes and mild intermittent asthma.She awakens to voice attends to the examiner, but easily nods off     CURRENT PAIN:  Her pain is located in the low back right and left outer thigh and both feet   It is described as Aching, Sharp, Shooting, Stabbing, Tender, and constant   She rates it as ranging between 4/10 and 8/10  The average is 6/10 on a scale of 0-10  Currently it is rated as 6/10  It improves by  medications, ice   It worsens by  movement   She  been compliant with the recommendations while in the hospital.      PAIN HISTORY:  The pain is mainly located in the  low back and right thigh   It is described as Aching, Miserable, Penetrating, Sharp, Shooting, Unbearable, and constant   Rates it as ranging between 7/10 and 10/10  Currently it is rated as 6/10  It improves by  medications, rest pacing   It worsens by  movement such as bending, lifting stooping and walking   She  been compliant with the recommendations while in the hospital.      PAST PAIN TREATMENT:   Medications: hydrocodone flexeril, acetaminophen (Tylenol),   Non-phamacologic modalities: Physical Therapy    Previous interventions/surgeries: yes, lumbar and cervical    [place] Board of Pharmacy Data Base Reviewed:    YES; As expected, no concern for  misuse/abuse of controlled medications based on this report.  OPIOID RISK [id number]           D.I.R.E Score: Patient Selection for Chronic Opioid Analgesia    For each factor, rate the patient's score from 1 - 3 based on the explanations on the right.       Diagnosis             3         1 = Benign chronic condition with minimal objective findings or no definite medical diagnosis.  Examples:  fibromyalgia, migraine, headaches, non-specific back pain.  2 = Slowly progressive condition concordant with moderate pain, or fixed condition with moderate objective findings.  Examples: failed back surgery syndrome, back pain with moderate degenerative changes, neuropathic pain.  3 = Advanced condition concordant with severe pain with objective findings.  Examples: severe ischemic vascular disease, advanced neuropathy, severe spinal stenosis.    Intractability             2         1 = Few therapies have been tried and the patient takes a passive role in his/her pain management process.   2 = Most costomary treatments have been tried but the patient is not fully engaged in the pain management process, or barriers prevent (insurance, transportation, medical illness)  3 = Patient fully engaged in a spectrum of appropriate treatments but with inadequate response.    Risk   (Risk = Total of P+C+R+S below)       Psychological             3         1 = Serious personality dysfunction or mental illness interfering with care.  Examples: personality disorder, severe affective disorder, significant personality issues.  2 = Personality or mental health interferes moderately.  Example: depression or anxiety disorder.  3 = Good communication with the clinic.  No significant personality dysfunction or mental illness.       Chemical      Health             3         1 = Active or very recent use of illicit drugs, excessive alcohol, or prescription drug abuse.  2 = Chemical coper (uses medications to cope with stress) or history of chemical  dependency in remission.  3 = No CD history.  Not drug-focused or chemically reliant       Reliability             2         1 = History of numerous problems: medication misuse, missed appointments, rarely follows through.  2 = Occasional difficulties with compliance, but generally reliable.  3 = Highly reliable patient with medications, appointments and treatment.       Social      Support             2         1= Life in chaos.  Little family support and few close relationships.  Loss of most normal life roles.  2 = Reduction in some relationships and life roles.  3 = Supportive family/close relationships.  Involved in work or school and no social isolation.    Efficacy score             2         1 = Poor function or minimal pain relief despite moderate to high doses.  2 = Moderate benefit with function improved in a number of ways (or insufficient info - hasn't tried opioid yet or very low doses or too short a trial.  3 = Good improvement in pain and function and quality of life with stable doses over time.                                    20    Total score = D + I + R + E    Score 7-13: Not a suitable candidate for long-term opioid analgesia  Score 14-21: May be a good candidate for long-term opioid analgesia    Copyright 2013 Elie Dobbs MD, The DIRE Score: Predicting Outcomes of Opioid Prescribing for Chronic Pain. The Journal of Pain. 7(9) (September), 2006:671-681    Past Medical History   I have reviewed this patient's medical history and updated it with pertinent information if needed.   Past Medical History:   Diagnosis Date     Anal intraepithelial neoplasia III (AIN III)      Diabetes (H)      Motion sickness      Osteopenia      PONV (postoperative nausea and vomiting)      Uncomplicated asthma        Past Surgical History   I have reviewed this patient's surgical history and updated it with pertinent information if needed.  Past Surgical History:   Procedure Laterality Date     ANESTHESIA OUT  OF OR MRI N/A 08/19/2022    Procedure: ANESTHESIA OUT OF OR Magnetic resonance imaging lumbar and thoracic spine without contrast @1100;  Surgeon: GENERIC ANESTHESIA PROVIDER;  Location: [facility] OR     BACK SURGERY  1995    discectomy     CERVICAL FUSION  2018    C4-7     CERVICAL FUSION  2019    C3-T2     COLONOSCOPY       ENT SURGERY      tonsilectomy and adenoidectomy as a child         Prior to Admission Medications   Prior to Admission Medications   Prescriptions Last Dose Informant Patient Reported? Taking?   Cholecalciferol (VITAMIN D3) 10 MCG/ML LIQD 1/18/2023  Yes Yes   Sig: Take by mouth daily   NONFORMULARY 1/18/2023  Yes Yes   Sig: daily Bone Stem liquid   RESVERATROL PO 1/17/2023  Yes Yes   Sig: Take by mouth daily liquid   albuterol (PROAIR HFA/PROVENTIL HFA/VENTOLIN HFA) 108 (90 Base) MCG/ACT inhaler 1/18/2023  Yes Yes   Sig: every 4 hours as needed   azelastine (ASTELIN) 0.1 % nasal spray 1/18/2023  Yes Yes   Sig: Spray 2 sprays into both nostrils 2 times daily as needed      Facility-Administered Medications: None     Allergies   Allergies   Allergen Reactions     Polyethylene Glycol Anaphylaxis     Polysorbate [Sorbitan] Anaphylaxis     Aspirin Nausea and Nausea and Vomiting     Can take asa 81mg       Imitrex [Sumatriptan] Hives and Swelling     Oseltamivir Itching     Sulfa Drugs Hives and Swelling     Gabapentin Dizziness and Rash     Lobster [Crustaceans] Hives and Rash       Social History   I have reviewed this patient's social history and updated it with pertinent information if needed. Joan GOMEZ Basim  reports that she has quit smoking. She has never used smokeless tobacco. She reports current alcohol use. She reports that she does not use drugs.    Family History   I have reviewed this patient's family history and updated it with pertinent information if needed.   History reviewed. No pertinent family history.  Family history of addiction  None     Review of Systems   The 10 point Review of  Systems is negative other than noted in the HPI or here.     Denies Bowel or bladder dysfunction    Physical Exam   Temp:  [96.6  F (35.9  C)-98.5  F (36.9  C)] 96.6  F (35.9  C)  Pulse:  [63-81] 77  Resp:  [12-30] 18  BP: (109-160)/(62-97) 141/63  SpO2:  [83 %-100 %] 91 %  202 lbs 14.4 oz  GEN:  Drowzy, oriented x 3, appears uncomfortable, No apparent distress.  HEENT:  Normocephalic/atraumatic, no scleral icterus, no nasal discharge, mouth moist.  CV:  RRR, S1, S2; no murmurs or other irregularities noted.  +3 DP/PT pulses bilaterally; no edema bilateral lower extremeties.  RESP:  Clear to auscultation bilaterally without rales/rhonchi/wheezing/retractions.  Symmetric chest rise on inhalation noted.  Normal respiratory effort.  ABD:  Rounded, soft, non-tender/non-distended.  +BS  EXT:  Edema & pulses as noted above.  Color, moisture and sensation intact x 4.     M/S:   Tender to palpation legs. KANG x 4  SKIN:  Dry to touch, no exanthems noted in the visualized areas.    NEURO: Symmetric strength +5/5.  Sensation to touch intact all extremities.   There is no area of allodynia or hyperesthesia.  PAIN BEHAVIOR: Cooperative  Psych:  Normal affect.  Calm, cooperative, conversant appropriately.       Data   Results for orders placed or performed during the hospital encounter of 01/19/23 (from the past 24 hour(s))   Glucose by meter   Result Value Ref Range    GLUCOSE BY METER POCT 152 (H) 70 - 99 mg/dL   Glucose by meter   Result Value Ref Range    GLUCOSE BY METER POCT 159 (H) 70 - 99 mg/dL   XR Surgery JAVY L/T 5 Min Fluoro w Stills    Narrative    This exam was marked as non-reportable because it will not be read by a   radiologist or a [place] non-radiologist provider.                    Render Post-Care Instructions In Note?: yes Dressing: pressure dressing with telfa Electrodesiccation And Curettage Text: . Render In Bullet Format When Appropriate: No Hemostasis: Drysol Biopsy Method: Dermablade Detail Level: Detailed Wound Care: Vaseline Depth Of Biopsy: dermis Additional Anesthesia Volume In Cc (Will Not Render If 0): 0 Lab Facility: [id number] Billing Type: Third-Party Bill Lab: [id number] Anesthesia Type: 1% lidocaine with 1:100,000 epinephrine and a 1:10 solution of 8.4% sodium bicarbonate Post-Care Instructions: I reviewed with the patient in detail post-care instructions. Patient is to keep the biopsy site dry overnight, and then apply Vaseline twice daily until healed. Anesthesia Volume In Cc (Will Not Render If 0): 0.5 Biopsy Type: H and E Consent: Verbal consent was obtained and risks were reviewed including but not limited to scarring, infection, bleeding, scabbing, incomplete removal, nerve damage and allergy to anesthesia. Type Of Destruction Used: Curettage

## 2023-01-19 NOTE — OP NOTE
REPORT OF OPERATION   Joan Stallworth is a 57 year old old female admitted on 1/19/2023  8:29 AM.  @Hospital Location  Operative Date:  1/19/2023  PRE-PROCEDURE DIAGNOSIS:  1) L1/2/3/4/5  degenerative disc disease, radiculopathy, claudication,  stenosis,  Listhesis, Scoliosis  2) Obesity BMI Body mass index is 35.94 kg/m .: Severe (choose one) obesity and torsion and scoilo of L1/2 3  added 25% to the surgery time due to difficulty with C arm imaging, positioning, and tissue retraction.  POST-PROCEDURE DIAGNOSIS:  1) Same as above  PROCEDURE PERFORMED:  1) L1/2/3/4/5 Oblique Lateral Lumbar Interbody fusion with discectomy, preparation of the endplate and placement of a titanium bullet cage packed with tri-calcium phosphate soaked in bone marrow anterior to the transverse process in modified prone position, with intraoperative biplanar fluoroscopic imaging and electrophysiological monitoring.  2) L1/2/3/4/5 Posterior minimally invasive pedicle screw placement and posterolateral instrumentation and fusion with system with intraoperative biplanar fluoroscopic imaging and electrophysiological monitoring.  3) Epidural steroid injection.  4) Transpedicular Bone marrow aspiration through separate incision  5) Injection of 0.75 Marcaine in paravertebral tissue for post op pain management  6) BMI 35 , patient is 202 lbs 14.4 oz  pounds. Additional sever torsion of vertebral boddy and scoli made surgery much mor complex   Surgeon: Alyse Tavares MD  ASSISTANT: Casi PATEL   This is complex surgery on the Spine and an assistant is needed for safety of the surgery for set up of instrumentation, retraction and closing.  HISTORY: Please refer to my clinic note for full details, but in short, the patient is a 57 years old female with severe back pain and radiculopathy not responding to usual conservative therapy. Patient was set up for the surgery as mentioned above and was taken to surgery as mentioned above after all risks  and benefits were explained.  PROCEDURE:  The patient was taken to surgery. After general anesthesia was applied, SCDs and Frank placed and preoperative antibiotic given. The patient was positioned on the Eduin table and Roddy frame in a modified prone position for ease of access from the left side.   Technical difficulty, high. Due to patients  large size, an additional 25% of time was spent positioning, draping, padding the patient, to minimize radiation exposure time and enable accurate placement of instrumentation.   AP and lateral fluoroscopic images are positioned. The patient has been prepped and draped in sterile fashion. Landmarks, including spinal process, transverse process, disk space, endplates and pedicles are identified and marked.   A Jamshidi needle is placed in the upper right pedicle inside of the vertebral body and bone marrow has been aspirated through separate incision to introduce stem cells and mix into the biologics.  Following steps are then taken for each specified level:  L1/2  Cage size 11 mm high and  30 mm long Titanium  L2/3  Cage size 10 mm high and 33 mm long Titanium  L3/4  Cage size 12 mm high and 33 mm long Titanium  L4/5  Cage size 11 mm high and 33 mm long Titanium    The patient was turned using the rotation of the surgical table so a near direct anterior-lateral approach to the lumbar spine could be achieved. A small incision was then made superior to the mid iliac crest and then using biplanar fluoroscopic visualization, under electrophysiological monitoring and stimulation, we introduced an electrophysiological probe through the retroperitoneal space into the desired discs anterior to the transverse processes and then passed it into the disc space after finding a silent window. The sleeve is retained and the probe is removed, then a K wire is passed sequentially into the disk space. A dilating tube was then passed along this same route. Following this, a working channel  was then passed sequentially into the disc spaces. The working channel was manually held in position while a series of disc cleaning tools was passed through the channel to remove the affected discs under clear and direct biplanar fluoroscopic visualization.  We decorticate the vertebral endplates at those segments.[id number]   Arthrodesis of the intervertebral spaces via an anterior retroperitoneal exposure was achieved through [place] and lateral extraforaminal space. Morselized tri-calcium phosphate soaked in bone marrow was added into the anterior disc space. The working channel was then removed. A TITANIUM interbody tightly packed with morselized tri-calcium phosphate soaked in bone marrow was then inserted into the mid portion of the intervertebral disc space over a K-Wire under biplanar fluoroscopic visualization and intraoperative neuromonitoring. The Interpedicular and intradiscal space was significantly enlarged and disc height was restored towards normal anatomy therefore releasing pressure on the nerve roots. Physiologically the spinal canal and lateral recess as well as foramen were bilaterally decompressed, and all bones were confined to the borders of the disc space   Following steps are then taken for each specified level:  L1 6.5mm Screw size Right 50 Left 50 small pedicle on left aimed for lateral breach   L2 6.5mm Screw size Right 50 Left 50 small pedicle on left aimed for lateral breach   L3 7.5mm Screw size Right 50 Left 50  L4 7.5mm Screw size Right 50 Left 50  L5 7.5mm Screw size Right 50 Left 50    The posterolateral fusion is initiated after the patient is rotated for a true prone position. The entry point to the pedicle is identified in the AP and lateral views and then the skin incision is injected with local anesthetic. We entered the pedicle with a Jamshidi needle. Over the Jamshidi needle we introduced the K wire into the vertebral body. Additionally, we use a small periosteal  decorticator along the screws to refresh the surface of the bone and facet and put some amounts of tri-calcium phosphate soaked in bone marrow for additional stability for the posterolateral fusion. Over the K wire we dilate the muscle with the dilator and insert the pedicle screws bilaterally. Screws are all silent up to 25 MA of stimulation. After screws are all placed, we put the sam in place and under fluoroscopic imaging, we lock the sam in place and remove the screw tops and then each incision has been closed with 2-0 Vicryl suture and then Steri-Strips were applied.  Before the end of surgery, we introduced a spinal needle and injected 2cc air and aspiration. After confirming we are in the epidural space and no CSF is returned, we injected 40 mg Kenalog and 1 cc 0.25% Marcaine for epidural steroid injection in epidural space under fluoroscopic imaging. Also, for postoperative pain management, 20 cc of 0.75% Marcaine  was injected deep in paravertebral tissue.     Additional finding: Patients  significanty torsion of vertebral boddy and scoli and sever  obesity with BMI 35 made this surgery throughout more complex and added 25 % more time to the surgery. Additionally, obtaining and interpreting x-ray images was made exponentially more difficult due to the patients substantially higher BMI.  Estimated blood: 125 cc.   DISPOSITION: To PACU with postoperative antibiotics. All counts are correct at the end of the surgery.  Alyse Tavares MD  cc: Alyse Tavares MD

## 2023-01-19 NOTE — ANESTHESIA POSTPROCEDURE EVALUATION
Patient: Joan Stallworth    Procedure: Procedure(s):  Lumbar 1 to lumbar 5 oblique lateral interbody fusion       Anesthesia Type:  General    Note:  Disposition: Admission   Postop Pain Control: Uneventful            Sign Out: Well controlled pain   PONV: No   Neuro/Psych: Uneventful            Sign Out: Acceptable/Baseline neuro status   Airway/Respiratory: Uneventful            Sign Out: Acceptable/Baseline resp. status   CV/Hemodynamics: Uneventful            Sign Out: Acceptable CV status   Other NRE: NONE   DID A NON-ROUTINE EVENT OCCUR? No           Last vitals:  Vitals Value Taken Time   /80 01/19/23 1615   Temp 98.5  F (36.9  C) 01/19/23 1615   Pulse 88 01/19/23 1628   Resp 16 01/19/23 1628   SpO2 95 % 01/19/23 1615   Vitals shown include unvalidated device data.    Electronically Signed By: Олег Keller MD  January 19, 2023  4:37 PM

## 2023-01-19 NOTE — PROGRESS NOTES
SPIRITUAL HEALTH SERVICES Progress Note  Pre op    Met with patient and  regarding request for presurgical prayer.    Patient is Yazidism and looks to her liliya as a resource.    Prayed at bedside with patient and  for health, and special requests: patient's family; and anyone in the world who is suffering.    No additional needs at this time.  [facility] remains available if further needs arise.    Rev. Izzy Sanchez M.Div.  Staff   Phone  [phone]

## 2023-01-19 NOTE — ANESTHESIA PREPROCEDURE EVALUATION
Anesthesia Pre-Procedure Evaluation    Patient: Joan Stallworth   MRN: [id number] : 1965        Procedure : Procedure(s):  Lumbar 1 to lumbar 5 oblique lateral interbody fusion          Past Medical History:   Diagnosis Date     Anal intraepithelial neoplasia III (AIN III)      Diabetes (H)      Motion sickness      Osteopenia      PONV (postoperative nausea and vomiting)      Uncomplicated asthma       Past Surgical History:   Procedure Laterality Date     ANESTHESIA OUT OF OR MRI N/A 2022    Procedure: ANESTHESIA OUT OF OR Magnetic resonance imaging lumbar and thoracic spine without contrast @1100;  Surgeon: GENERIC ANESTHESIA PROVIDER;  Location: [facility] OR     BACK SURGERY      discectomy     CERVICAL FUSION      C4-7     CERVICAL FUSION      C3-T2     COLONOSCOPY       ENT SURGERY      tonsilectomy and adenoidectomy as a child      Allergies   Allergen Reactions     Polyethylene Glycol Anaphylaxis     Polysorbate [Sorbitan] Anaphylaxis     Aspirin Nausea and Nausea and Vomiting     Can take asa 81mg       Imitrex [Sumatriptan] Hives and Swelling     Oseltamivir Itching     Sulfa Drugs Hives and Swelling     Gabapentin Dizziness and Rash     Lobster [Crustaceans] Hives and Rash      Social History     Tobacco Use     Smoking status: Former     Smokeless tobacco: Never   Substance Use Topics     Alcohol use: Yes     Comment: rarely      Wt Readings from Last 1 Encounters:   23 92 kg (202 lb 14.4 oz)        Anesthesia Evaluation            ROS/MED HX  ENT/Pulmonary:     (+) allergic rhinitis, Mild Persistent, asthma     Neurologic:     (+) peripheral neuropathy,     Cardiovascular:  - neg cardiovascular ROS     METS/Exercise Tolerance:     Hematologic:  - neg hematologic  ROS     Musculoskeletal:   (+) arthritis,     GI/Hepatic:     (+) GERD, Asymptomatic on medication,     Renal/Genitourinary:  - neg Renal ROS     Endo:     (+) type II DM, Not using insulin, Obesity,      Psychiatric/Substance Use:     (+) psychiatric history anxiety     Infectious Disease:  - neg infectious disease ROS     Malignancy:       Other:      (+) , H/O Chronic Pain,        Physical Exam    Airway        Mallampati: II   TM distance: > 3 FB   Neck ROM: full   Mouth opening: > 3 cm    Respiratory Devices and Support         Dental       (+) Minor Abnormalities - some fillings, tiny chips      Cardiovascular          Rhythm and rate: regular and normal     Pulmonary           breath sounds clear to auscultation           OUTSIDE LABS:  CBC: No results found for: WBC, HGB, HCT, PLT  BMP:   Lab Results   Component Value Date     09/16/2022     08/05/2022    POTASSIUM 4.5 09/16/2022    POTASSIUM 3.8 08/05/2022    CHLORIDE 104 09/16/2022    CHLORIDE 105 08/05/2022    CO2 26 09/16/2022    CO2 25 08/05/2022    BUN 18 09/16/2022    BUN 15 08/05/2022    CR 0.62 09/16/2022    CR 0.59 08/05/2022     (H) 09/16/2022     (H) 08/19/2022     COAGS: No results found for: PTT, INR, FIBR  POC: No results found for: BGM, HCG, HCGS  HEPATIC:   Lab Results   Component Value Date    ALBUMIN 4.2 09/16/2022    PROTTOTAL 7.6 09/16/2022    ALT 35 09/16/2022    AST 20 09/16/2022    ALKPHOS 80 09/16/2022    BILITOTAL 0.6 09/16/2022     OTHER:   Lab Results   Component Value Date    AZEB 10.0 09/16/2022       Anesthesia Plan    ASA Status:  2   NPO Status:  NPO Appropriate    Anesthesia Type: General.     - Airway: ETT   Induction: Intravenous.   Maintenance: Balanced.   Techniques and Equipment:       - Drips/Meds: Dexmed. infusion     Consents    Anesthesia Plan(s) and associated risks, benefits, and realistic alternatives discussed. Questions answered and patient/representative(s) expressed understanding.    - Discussed:     - Discussed with:  Patient      - Extended Intubation/Ventilatory Support Discussed: No.      - Patient is DNR/DNI Status: No    Use of blood products discussed: No .     Postoperative  Care    Pain management: Oral pain medications, IV analgesics, Multi-modal analgesia.     - Plan for long acting post-op opioid use   PONV prophylaxis: Ondansetron (or other 5HT-3), Dexamethasone or Solumedrol, Scopolamine patch     Comments:                Олег Keller MD

## 2023-01-19 NOTE — PROGRESS NOTES
CPAP order.  RT checked on pt for CPAP. Pt stated she doesn't use a CPAP at home and never use a CPAP before.    Pt doesn't wish to use a CPAP while is here.

## 2023-01-19 NOTE — ANESTHESIA PROCEDURE NOTES
Airway         Procedure Start/Stop Times: 1/19/2023 12:11 PM  Staff -        CRNA: Izzy Wray APRN CRNA       Performed By: CRNA  Consent for Airway        Urgency: elective  Indications and Patient Condition       Indications for airway management: cindi-procedural       Induction type:intravenous       Mask difficulty assessment: 1 - vent by mask    Final Airway Details       Final airway type: endotracheal airway       Successful airway: ETT - single  Endotracheal Airway Details        ETT size (mm): 7.0       Cuffed: yes       Successful intubation technique: video laryngoscopy       VL Blade Size: Glidescope 3       Grade View of Cords: 1       Adjucts: stylet       Bite block used: Soft    Post intubation assessment        Placement verified by: capnometry        Number of attempts at approach: 1       Secured with: plastic tape       Ease of procedure: easy       Dentition: Intact    Medication(s) Administered   Medication Administration Time: 1/19/2023 12:11 PM

## 2023-01-19 NOTE — ANESTHESIA CARE TRANSFER NOTE
Patient: oJan Stallworth    Procedure: Procedure(s):  Lumbar 1 to lumbar 5 oblique lateral interbody fusion       Diagnosis: Idiopathic scoliosis of lumbar spine [M41.26]  Diagnosis Additional Information: No value filed.    Anesthesia Type:   General     Note:    Oropharynx: oropharynx clear of all foreign objects and spontaneously breathing  Level of Consciousness: drowsy  Oxygen Supplementation: face mask    Independent Airway: airway patency satisfactory and stable  Dentition: dentition unchanged  Vital Signs Stable: post-procedure vital signs reviewed and stable  Report to RN Given: handoff report given  Patient transferred to: PACU    Handoff Report: Identifed the Patient, Identified the Reponsible Provider, Reviewed the pertinent medical history, Discussed the surgical course, Reviewed Intra-OP anesthesia mangement and issues during anesthesia, Set expectations for post-procedure period and Allowed opportunity for questions and acknowledgement of understanding      Vitals:  Vitals Value Taken Time   /80 01/19/23 1450   Temp     Pulse 85 01/19/23 1452   Resp 21 01/19/23 1452   SpO2 100 % 01/19/23 1452   Vitals shown include unvalidated device data.    Electronically Signed By: WALTER Costa CRNA  January 19, 2023  2:54 PM

## 2023-01-19 NOTE — DISCHARGE SUMMARY
Discharge Summary    Attending Physician:  Alyse Tavares MD  Admit Date: 1/19/2023    Discharge Date: 1/20/22  Primary Care Physician: Tamie Mxion    Discharge Diagnoses  [unfilled]    Discharge Exam    AAOx3 KANG f/c all for , no weakness, No new sensory deficit, incision C/D/I        Preliminary Discharge Medications    This list of medications is preliminary and tentative.  Please see the After Visit Summary for the final and accurate medication list.    [unfilled]    Procedures Performed and Findings  Procedure(s):  Lumbar 1 to lumbar 5 oblique lateral interbody fusion       Consultations Obtained  None    Code Status   No Order    Discharge Disposition        Diet on Discharge   Regular    Activity on Discharge   Your activity upon discharge: Ad nathaniel within following limitations:  No excessive activities   No Bending, Twisting, climbing, Crawling,   No lifting more than 8 lb for 2 weeks, or 15 lb for 2 months or 25 lb for 4 months or 35 lb for 6 months  Brace for riding cars for 4-6 months      Discharge Instructions  Per [facility] instruction : [email or web address]        Follow-Up Scheduled    Follow up in 2 weeks with me or PCP for wound check ( patient's choice) if patients want to go to PCP for wound check, then f/u in my office  In 3 months      Hospital Course   Hospital Course unremarkable , adequate ambulation in due time, pain controlled , cleared by PT/OT, no events         CC Dr Tavares/[facility] mention

## 2023-01-20 ENCOUNTER — APPOINTMENT (OUTPATIENT)
Dept: PHYSICAL THERAPY | Facility: CLINIC | Age: 58
End: 2023-01-20
Attending: NEUROLOGICAL SURGERY
Payer: COMMERCIAL

## 2023-01-20 ENCOUNTER — APPOINTMENT (OUTPATIENT)
Dept: OCCUPATIONAL THERAPY | Facility: CLINIC | Age: 58
End: 2023-01-20
Attending: NEUROLOGICAL SURGERY
Payer: COMMERCIAL

## 2023-01-20 VITALS
DIASTOLIC BLOOD PRESSURE: 54 MMHG | RESPIRATION RATE: 16 BRPM | WEIGHT: 202.9 LBS | SYSTOLIC BLOOD PRESSURE: 130 MMHG | BODY MASS INDEX: 35.95 KG/M2 | OXYGEN SATURATION: 95 % | TEMPERATURE: 97.3 F | HEIGHT: 63 IN | HEART RATE: 71 BPM

## 2023-01-20 LAB
ANION GAP SERPL CALCULATED.3IONS-SCNC: 6 MMOL/L (ref 7–15)
BASOPHILS # BLD AUTO: 0 10E3/UL (ref 0–0.2)
BASOPHILS NFR BLD AUTO: 0 %
BUN SERPL-MCNC: 12.7 MG/DL (ref 8–23)
CALCIUM SERPL-MCNC: 9.3 MG/DL (ref 8.6–10)
CHLORIDE SERPL-SCNC: 103 MMOL/L (ref 98–107)
CREAT SERPL-MCNC: 0.64 MG/DL (ref 0.51–0.95)
DEPRECATED HCO3 PLAS-SCNC: 32 MMOL/L (ref 22–29)
EOSINOPHIL # BLD AUTO: 0 10E3/UL (ref 0–0.7)
EOSINOPHIL NFR BLD AUTO: 0 %
ERYTHROCYTE [DISTWIDTH] IN BLOOD BY AUTOMATED COUNT: 12.9 % (ref 10–15)
GFR SERPL CREATININE-BSD FRML MDRD: >90 ML/MIN/1.73M2
GLUCOSE BLDC GLUCOMTR-MCNC: 152 MG/DL (ref 70–99)
GLUCOSE SERPL-MCNC: 174 MG/DL (ref 70–99)
HCT VFR BLD AUTO: 37.9 % (ref 35–47)
HGB BLD-MCNC: 12.3 G/DL (ref 11.7–15.7)
IMM GRANULOCYTES # BLD: 0.1 10E3/UL
IMM GRANULOCYTES NFR BLD: 1 %
LYMPHOCYTES # BLD AUTO: 1.1 10E3/UL (ref 0.8–5.3)
LYMPHOCYTES NFR BLD AUTO: 9 %
MCH RBC QN AUTO: 30.7 PG (ref 26.5–33)
MCHC RBC AUTO-ENTMCNC: 32.5 G/DL (ref 31.5–36.5)
MCV RBC AUTO: 95 FL (ref 78–100)
MONOCYTES # BLD AUTO: 0.8 10E3/UL (ref 0–1.3)
MONOCYTES NFR BLD AUTO: 6 %
NEUTROPHILS # BLD AUTO: 10.3 10E3/UL (ref 1.6–8.3)
NEUTROPHILS NFR BLD AUTO: 84 %
NRBC # BLD AUTO: 0 10E3/UL
NRBC BLD AUTO-RTO: 0 /100
PLATELET # BLD AUTO: 260 10E3/UL (ref 150–450)
POTASSIUM SERPL-SCNC: 4.7 MMOL/L (ref 3.4–5.3)
RBC # BLD AUTO: 4.01 10E6/UL (ref 3.8–5.2)
SODIUM SERPL-SCNC: 141 MMOL/L (ref 136–145)
WBC # BLD AUTO: 12.2 10E3/UL (ref 4–11)

## 2023-01-20 PROCEDURE — 80048 BASIC METABOLIC PNL TOTAL CA: CPT | Performed by: NEUROLOGICAL SURGERY

## 2023-01-20 PROCEDURE — 36415 COLL VENOUS BLD VENIPUNCTURE: CPT | Performed by: NEUROLOGICAL SURGERY

## 2023-01-20 PROCEDURE — 97161 PT EVAL LOW COMPLEX 20 MIN: CPT | Mod: GP

## 2023-01-20 PROCEDURE — 250N000011 HC RX IP 250 OP 636: Performed by: NEUROLOGICAL SURGERY

## 2023-01-20 PROCEDURE — 97165 OT EVAL LOW COMPLEX 30 MIN: CPT | Mod: GO

## 2023-01-20 PROCEDURE — 97116 GAIT TRAINING THERAPY: CPT | Mod: GP

## 2023-01-20 PROCEDURE — 250N000013 HC RX MED GY IP 250 OP 250 PS 637: Performed by: NEUROLOGICAL SURGERY

## 2023-01-20 PROCEDURE — 250N000013 HC RX MED GY IP 250 OP 250 PS 637: Performed by: NURSE PRACTITIONER

## 2023-01-20 PROCEDURE — 85025 COMPLETE CBC W/AUTO DIFF WBC: CPT | Performed by: NEUROLOGICAL SURGERY

## 2023-01-20 PROCEDURE — 99221 1ST HOSP IP/OBS SF/LOW 40: CPT | Performed by: INTERNAL MEDICINE

## 2023-01-20 PROCEDURE — 97530 THERAPEUTIC ACTIVITIES: CPT | Mod: GP

## 2023-01-20 RX ADMIN — ACETAMINOPHEN 975 MG: 325 TABLET, FILM COATED ORAL at 14:45

## 2023-01-20 RX ADMIN — ACETAMINOPHEN 975 MG: 325 TABLET, FILM COATED ORAL at 06:30

## 2023-01-20 RX ADMIN — METHOCARBAMOL 500 MG: 500 TABLET ORAL at 14:45

## 2023-01-20 RX ADMIN — METHOCARBAMOL 500 MG: 500 TABLET ORAL at 03:21

## 2023-01-20 RX ADMIN — CEFAZOLIN SODIUM 2 G: 2 INJECTION, SOLUTION INTRAVENOUS at 03:22

## 2023-01-20 RX ADMIN — METHOCARBAMOL 500 MG: 500 TABLET ORAL at 09:11

## 2023-01-20 RX ADMIN — OXYCODONE HYDROCHLORIDE 10 MG: 10 TABLET ORAL at 11:27

## 2023-01-20 RX ADMIN — OXYCODONE HYDROCHLORIDE 5 MG: 5 TABLET ORAL at 04:06

## 2023-01-20 RX ADMIN — SENNOSIDES AND DOCUSATE SODIUM 1 TABLET: 50; 8.6 TABLET ORAL at 09:11

## 2023-01-20 RX ADMIN — HYDROXYZINE HYDROCHLORIDE 25 MG: 25 TABLET ORAL at 11:24

## 2023-01-20 RX ADMIN — HYDROXYZINE HYDROCHLORIDE 25 MG: 25 TABLET ORAL at 05:39

## 2023-01-20 ASSESSMENT — ACTIVITIES OF DAILY LIVING (ADL)
ADLS_ACUITY_SCORE: 22
ADLS_ACUITY_SCORE: 26
ADLS_ACUITY_SCORE: 22
ADLS_ACUITY_SCORE: 26
ADLS_ACUITY_SCORE: 26
ADLS_ACUITY_SCORE: 22

## 2023-01-20 NOTE — PLAN OF CARE
Physical Therapy Discharge Summary    Reason for therapy discharge:    Discharged to home.    Progress towards therapy goal(s). See goals on Care Plan in [facility] electronic health record for goal details.  Goals partially met.  Barriers to achieving goals:   discharge from facility.    Therapy recommendation(s):    No further therapy is recommended.  Recommending pt have assistance from spouse as needed at home; also, recommending continued use of walker due to increased pain, decreased activity tolerance, and impaired balance with ambulation. Pt discharged prior to order or delivery of walker. Walker need documented in chart.

## 2023-01-20 NOTE — PROGRESS NOTES
"Summary: 1500 - 1900    Pt came from PACU at around 1630, was accompanied by spouse, A&O x4, has been in bed since then, was given IV Dilaudid for pain. Pt is on regular diet, on O2 2L,  capno numbers are wnl, NS infusing at 100 ml/hr, the dressing on the incision site is CDI, CMS intact. Continue monitoring.    /57 (BP Location: Left arm)   Pulse 74   Temp 97.3  F (36.3  C) (Temporal)   Resp 12   Ht 1.6 m (5' 3\")   Wt 92 kg (202 lb 14.4 oz)   SpO2 98%   BMI 35.94 kg/m      "

## 2023-01-20 NOTE — CONSULTS
[facility]    Hospitalist Consultation    Date of Admission:  1/19/2023  Date of Consult (When I saw the patient): 01/20/23    Assessment & Plan   Joan Stallworth is a 57 year old female patient with past medical history of diabetes mellitus type 2, obesity, degenerative disc disease,  uncomplicated asthma, GERD, who was admitted for elective surgery. Patient underwent lumbar fusion on 1/19/2023. Hospitalist team was consulted for postop medical management.    1. S/P L1/2/3/4/5 Oblique Lateral Lumbar Interbody fusion with discectomy, POD#1  -Pain management, DVT prophylaxis, PT/OT per primary team    2. DM type 2, diet controlled  -Spine surgery planning to discharge her today. Outpatient follow up with PCP      DVT Prophylaxis: Defer to primary service  Code Status: Full Code    Disposition: Expected discharge per primary team    Ronn Palmer MD    Reason for Consult   Reason for consult: Postop medical management    Primary Care Physician   Tamie Mixon    Chief Complaint   Postop medical management    History is obtained from the patient    History of Present Illness   Joan Stallworth is a 57 year old female patient with past medical history of diabetes mellitus type 2, obesity, degenerative disc disease,  uncomplicated asthma, GERD, who was admitted for elective surgery. Patient underwent lumbar fusion on 1/19/2023. Hospitalist team was consulted for postop medical management.  Currently she denies nausea, vomiting, abdominal pain, shortness of breath, cough, fever.  Patient stated that she was recently diagnosed with diabetes but not started on treatment yet. She stated that she is on dietary management. She has no history of hypertension or hyperlipidemia. She denies prior history of stroke or cardiac disease.     Past Medical History    I have reviewed this patient's medical history and updated it with pertinent information if needed.   Past Medical History:   Diagnosis Date      Anal intraepithelial neoplasia III (AIN III)      Diabetes (H)      Motion sickness      Osteopenia      PONV (postoperative nausea and vomiting)      Uncomplicated asthma        Past Surgical History   I have reviewed this patient's surgical history and updated it with pertinent information if needed.  Past Surgical History:   Procedure Laterality Date     ANESTHESIA OUT OF OR MRI N/A 08/19/2022    Procedure: ANESTHESIA OUT OF OR Magnetic resonance imaging lumbar and thoracic spine without contrast @1100;  Surgeon: GENERIC ANESTHESIA PROVIDER;  Location: [facility] OR     BACK SURGERY  1995    discectomy     CERVICAL FUSION  2018    C4-7     CERVICAL FUSION  2019    C3-T2     COLONOSCOPY       ENT SURGERY      tonsilectomy and adenoidectomy as a child       Prior to Admission Medications   Prior to Admission Medications   Prescriptions Last Dose Informant Patient Reported? Taking?   Cholecalciferol (VITAMIN D3) 10 MCG/ML LIQD 1/18/2023  Yes Yes   Sig: Take by mouth daily   NONFORMULARY 1/18/2023  Yes Yes   Sig: daily Bone Stem liquid   RESVERATROL PO 1/17/2023  Yes Yes   Sig: Take by mouth daily liquid   albuterol (PROAIR HFA/PROVENTIL HFA/VENTOLIN HFA) 108 (90 Base) MCG/ACT inhaler 1/18/2023  Yes Yes   Sig: every 4 hours as needed   azelastine (ASTELIN) 0.1 % nasal spray 1/18/2023  Yes Yes   Sig: Spray 2 sprays into both nostrils 2 times daily as needed      Facility-Administered Medications: None     Allergies   Allergies   Allergen Reactions     Polyethylene Glycol Anaphylaxis     Polysorbate [Sorbitan] Anaphylaxis     Aspirin Nausea and Nausea and Vomiting     Can take asa 81mg       Imitrex [Sumatriptan] Hives and Swelling     Oseltamivir Itching     Sulfa Drugs Hives and Swelling     Gabapentin Dizziness and Rash     Lobster [Crustaceans] Hives and Rash       Social History   I have reviewed this patient's social history and updated it with pertinent information if needed. Joan Stallworth  reports that she has quit  smoking. She has never used smokeless tobacco. She reports current alcohol use. She reports that she does not use drugs.    Family History   I have reviewed this patient's family history and updated it with pertinent information if needed.   History reviewed. No pertinent family history.    Review of Systems   The 10 point Review of Systems is negative other than noted in the HPI or here. Postop medical management    Physical Exam   Temp: 97.6  F (36.4  C) Temp src: Temporal BP: 110/40 Pulse: 69   Resp: 12 SpO2: 93 % O2 Device: None (Room air) Oxygen Delivery: 2 LPM  Vital Signs with Ranges  Temp:  [96.6  F (35.9  C)-98.5  F (36.9  C)] 97.6  F (36.4  C)  Pulse:  [62-84] 69  Resp:  [12-30] 12  BP: (109-141)/(40-87) 110/40  FiO2 (%):  [20 %-22 %] 20 %  SpO2:  [83 %-100 %] 93 %  202 lbs 14.4 oz    GEN:  Alert, oriented x 3, appears comfortable, NAD.  HEENT:  Normocephalic/atraumatic, no scleral icterus, no nasal discharge, mouth moist.  CV:  Regular rate and rhythm, no murmur or JVD.  S1 + S2 noted, no S3 or S4.  LUNGS:  Clear to auscultation bilaterally without rales/rhonchi/wheezing/retractions.  Symmetric chest rise on inhalation noted.  ABD:  Active bowel sounds, soft, non-tender/non-distended.  No rebound/guarding/rigidity.  EXT:  No edema or cyanosis.  Hands/feet warm to touch with good signs of peripheral perfusion.  No joint synovitis noted.  SKIN:  Dry to touch, no exanthems noted in the visualized areas.  NEURO:  Symmetric muscle strength, sensation to touch grossly intact.  No new focal deficits appreciated.    Data   -Data reviewed today: All pertinent laboratory and imaging results from this encounter were reviewed. I personally reviewed  Recent Labs   Lab 01/20/23  0753 01/20/23  0630 01/19/23  1459   WBC  --  12.2*  --    HGB  --  12.3  --    MCV  --  95  --    PLT  --  260  --    NA  --  141  --    POTASSIUM  --  4.7  --    CHLORIDE  --  103  --    CO2  --  32*  --    BUN  --  12.7  --    CR  --  0.64   --    ANIONGAP  --  6*  --    AZEB  --  9.3  --    * 174* 159*       Recent Results (from the past 24 hour(s))   XR Surgery JAVY L/T 5 Min Fluoro w Stills    Narrative    This exam was marked as non-reportable because it will not be read by a   radiologist or a [facility] non-radiologist provider.

## 2023-01-20 NOTE — PROGRESS NOTES
SPIRITUAL HEALTH SERVICES Progress Note  RH  Ortho/Spine    Followed up with patient to see how surgery went. Pt's  was in the room. Pt mentioned that Jainism was an important part of her life and that she had a good support system. Pt requested a prayer of thanks. Pt was grateful the surgery went well and denied any emotional or spiritual needs at this time.     Plan: Informed pt how she can request further  support.    PAMELLA Tatum    Intern    [facility] routine referrals *[phone]   [facility] available 24/7 for emergent requests/referrals, either by paging the on-call  or by entering an ASAP/STAT consult in Epic (this will also page the on-call ).

## 2023-01-20 NOTE — PROGRESS NOTES
01/20/23 0826   Appointment Info   Signing Clinician's Name / Credentials (PT) Yamilrodrigo Massey DPT   Living Environment   People in Home spouse   Current Living Arrangements house   Home Accessibility stairs to enter home   Number of Stairs, Main Entrance 3   Stair Railings, Main Entrance railing on right side (ascending)   Transportation Anticipated family or friend will provide   Living Environment Comments Pt lives in single family home, 3 DAYSI with railing. Spouse able to help at home as needed including laundry in basement. Pt owns adjustable bed.   Self-Care   Usual Activity Tolerance good   Current Activity Tolerance moderate   Equipment Currently Used at Home cane, straight   Fall history within last six months no   Activity/Exercise/Self-Care Comment Pt is IND with functional mobility. Pt uses SEC for longer distances. Pt owns SEC, grabber, back brace. Pt has ordered grab bars.   General Information   Onset of Illness/Injury or Date of Surgery 01/19/23   Referring Physician Alyse Tavares MD   Patient/Family Therapy Goals Statement (PT) get home with spouse.   Pertinent History of Current Problem (include personal factors and/or comorbidities that impact the POC) Pt is 58 yo female, s/p Lumbar 1 to lumbar 5 oblique lateral interbody fusion, POD #1.   Existing Precautions/Restrictions fall;spinal   General Observations Per chart, brace to be worn in car rides (otherwise for comfort)   Cognition   Affect/Mental Status (Cognition) WFL   Orientation Status (Cognition) oriented x 4   Pain Assessment   Patient Currently in Pain   (5/10 in LB, some into posterior left thigh.)   Integumentary/Edema   Integumentary/Edema Comments dressing and bandage on T-spine to L-spine. No signs of infection.   Posture    Posture Not impaired   Range of Motion (ROM)   Range of Motion ROM is WFL   ROM Comment decreased trunk ROM.   Strength (Manual Muscle Testing)   Strength (Manual Muscle Testing) strength is WFL   Bed  "Mobility   Comment, (Bed Mobility) not assessed on this date.   Transfers   Transfers sit-stand transfer   Comment, (Transfers) STS with FWW and CGA.   Gait/Stairs (Locomotion)   Comment, (Gait/Stairs) Pt ambulated 10' with FWW and CGA.   Balance   Balance no deficits were identified   Sensory Examination   Sensory Perception Comments B distal LEs and UEs tingling. Pt reports \"hands freeze, tingle or just hurt\", L LE worse than R LE.   Clinical Impression   Criteria for Skilled Therapeutic Intervention Yes, treatment indicated   PT Diagnosis (PT) impaired functional mobility   Influenced by the following impairments pain, decreased ROM, decreased strength   Functional limitations due to impairments difficulty with bed mobility, transfers, and gait.   Clinical Presentation (PT Evaluation Complexity) Stable/Uncomplicated   Clinical Presentation Rationale clinical judgement   Clinical Decision Making (Complexity) low complexity   Planned Therapy Interventions (PT) balance training;bed mobility training;gait training;stair training;strengthening;stretching;transfer training;progressive activity/exercise   Anticipated Equipment Needs at Discharge (PT) walker, rolling   Risk & Benefits of therapy have been explained evaluation/treatment results reviewed;care plan/treatment goals reviewed;risks/benefits reviewed;current/potential barriers reviewed;participants voiced agreement with care plan;participants included;patient   PT Total Evaluation Time   PT Eval, Low Complexity Minutes (80705) 10   Plan of Care Review   Plan of Care Reviewed With patient;spouse   Physical Therapy Goals   PT Frequency Daily   PT Predicted Duration/Target Date for Goal Attainment 01/26/23   PT Goals Bed Mobility;Transfers;Gait;Stairs   PT: Bed Mobility Supervision/stand-by assist;Supine to/from sit;Within precautions   PT: Transfers Supervision/stand-by assist;Sit to/from stand;Assistive device   PT: Gait Supervision/stand-by assist;Rolling " walker;100 feet   PT: Stairs Minimal assist;Rail on right;3 stairs   PT Discharge Planning   PT Plan assess bed mobility, review stairs and ambulation; provide spine handout.   PT Discharge Recommendation (DC Rec) home with assist   PT Rationale for DC Rec Pt at baseline is IND with functional mobility, uses SEC for longer distances. Pt below baseline, but moving well on this date. Anticipate pt will progress to be SBA with all mobility except stairs with Joana (spouse can provide). Pt will need a FWW prior to discharge.   PT Brief overview of current status STS and ambulation with FWW and CGA.   Total Session Time   Total Session Time (sum of timed and untimed services) 10

## 2023-01-20 NOTE — PLAN OF CARE
Goal Outcome Evaluation:                    Reviewed discharge instructions with patient and spouse. Questions answered. Patient discharged to home, discharge instructions,belongings.     Dressing changed and spouse educated on dressing change needs and s/sx of infection   Discharge home with spouse

## 2023-01-20 NOTE — CONSULTS
Care Management Initial Consult    General Information  Assessment completed with:  ,         Primary Care Provider verified and updated as needed:     Readmission within the last 30 days:           Advance Care Planning:            Communication Assessment  Patient's communication style: spoken language (English or Bilingual)    Hearing Difficulty or Deaf: no   Wear Glasses or Blind: no    Cognitive  Cognitive/Neuro/Behavioral: WDL  Level of Consciousness: alert                   Living Environment:   People in home: spouse     Current living Arrangements:        Able to return to prior arrangements:               Community Resources:    Equipment currently used at home: cane, straight  Supplies currently used at home:           Lifestyle & Psychosocial Needs:  Social Determinants of Health     Tobacco Use: Medium Risk     Smoking Tobacco Use: Former     Smokeless Tobacco Use: Never     Passive Exposure: Not on file   Alcohol Use: Not on file   Financial Resource Strain: Not on file   Food Insecurity: Not on file   Transportation Needs: Not on file   Physical Activity: Not on file   Stress: Not on file   Social Connections: Not on file   Intimate Partner Violence: Not on file   Depression: Not at risk     PHQ-2 Score: 0   Housing Stability: Not on file                       Values/Beliefs:  Spiritual, Cultural Beliefs, Rastafarian Practices, Values that affect care:    Description of Beliefs that Will Affect Care: notified 12/26/22            Additional Information:  CM clearing consult. No needs recognized at this time. PT recommends going home with assist.     .Rosalind Duckworth RN  Inpatient RN Care Coordinator  [facility]  [phone]    Rosalind Duckworth, RN

## 2023-01-20 NOTE — PHARMACY-ADMISSION MEDICATION HISTORY
Admission medication history interview status for this patient is complete. See [facility] admission navigator for allergy information, prior to admission medications and immunization status.     Medication history interview done, indicate source(s): Patient (nurse complete)  Medication history resources (including written lists, pill bottles, clinic record):[place]  Pharmacy: [facility]    Changes made to PTA medication list:  Added: None  Changed: None  Reported as Not Taking: None  Removed: None    Actions taken by pharmacist (provider contacted, etc):None     Additional medication history information:None    Medication reconciliation/reorder completed by provider prior to medication history?  N   (Y/N)          Prior to Admission medications    Medication Sig Last Dose Taking? Auth Provider Long Term End Date   albuterol (PROAIR HFA/PROVENTIL HFA/VENTOLIN HFA) 108 (90 Base) MCG/ACT inhaler every 4 hours as needed 1/18/2023 Yes Reported, Patient Yes    azelastine (ASTELIN) 0.1 % nasal spray Spray 2 sprays into both nostrils 2 times daily as needed 1/18/2023 Yes Reported, Patient     Cholecalciferol (VITAMIN D3) 10 MCG/ML LIQD Take by mouth daily 1/18/2023 Yes Reported, Patient     hydrOXYzine (ATARAX) 25 MG tablet Take 1 tablet (25 mg) by mouth every 6 hours  Yes Alyse Tavares MD     methocarbamol (ROBAXIN) 500 MG tablet Take 1 tablet (500 mg) by mouth every 6 hours  Yes Alyse Tavares MD     NONFORMULARY daily Bone Stem liquid 1/18/2023 Yes Reported, Patient     oxyCODONE IR (ROXICODONE) 10 MG tablet Take 1 tablet (10 mg) by mouth every 4 hours as needed for moderate pain (4-6)  Yes Alyse Tavares MD     RESVERATROL PO Take by mouth daily liquid 1/17/2023 Yes Reported, Patient

## 2023-01-20 NOTE — PROGRESS NOTES
01/20/23 1345   Appointment Info   Signing Clinician's Name / Credentials (OT) Chrissy Keller OTR/L   Living Environment   People in Home spouse   Current Living Arrangements house   Self-Care   Usual Activity Tolerance good   Current Activity Tolerance moderate   Equipment Currently Used at Home grab bar, tub/shower  (toilet safety frame. reacher. long handled bath sponge. toilet hygiene aid. shower chair. Has step in shower and tub/shower combination)   Activity/Exercise/Self-Care Comment At baseline is independent in self-cares   General Information   Onset of Illness/Injury or Date of Surgery 01/19/23   Referring Physician Alyse Tavares MD   Patient/Family Therapy Goal Statement (OT) Return home   Additional Occupational Profile Info/Pertinent History of Current Problem 57 year old female patient with past medical history of diabetes mellitus type 2, obesity, degenerative disc disease,  uncomplicated asthma, GERD, who was admitted for elective surgery. Patient underwent lumbar fusion on 1/19/2023   Existing Precautions/Restrictions spinal   Cognitive Status Examination   Affect/Mental Status (Cognitive) WFL   Follows Commands follows multi-step commands   Pain Assessment   Patient Currently in Pain Yes, see Vital Sign flowsheet   Transfers   Transfers toilet transfer;shower transfer   Shower Transfer   [place] Level (Shower Transfer) supervision   Toilet Transfer   [place] Level (Toilet Transfer) modified independence   Activities of Daily Living   BADL Assessment/Intervention upper body dressing;lower body dressing;toileting;grooming   Upper Body Dressing Assessment/Training   [place] Level (Upper Body Dressing) modified independence   Lower Body Dressing Assessment/Training   [place] Level (Lower Body Dressing) modified independence   Grooming Assessment/Training   [place] Level (Grooming) modified independence   Toileting   [place] Level (Toileting) modified independence    Clinical Impression   Criteria for Skilled Therapeutic Interventions Met (OT) No problems identified which require skilled intervention   Clinical Decision Making Complexity (OT) low complexity   Anticipated Equipment Needs Upon Discharge (OT)   (Pt has needed equipment)   OT Total Evaluation Time   OT Eval, Low Complexity Minutes (91991) 21   OT Discharge Planning   OT Discharge Recommendation (DC Rec) home with assist   OT Rationale for DC Rec Pt has no indication for skilled OT. Pt demonstrating independence in toileting, dressing, grooming. Pt able to independently verbalize spine precautions, vehicle transfer technique, pain management strategies, how to progress activity tolerance. Pt has needed self care adaptive equipment. Recommend pt have supervision with bathing and assist with IADLs (cooking, cleaning, laundry, etc). Pt reports she can have this level of assist at home. OT will sign off.   Total Session Time   Total Session Time (sum of timed and untimed services) 21

## 2023-01-20 NOTE — PROGRESS NOTES
DAILY PROGRESS NOTE    Joan Stallworth is a 57 year old old female admitted on 1/19/2023  8:29 AM.    Subjective  Comfortable      Objective    AAOx3, KANG , f/c 4/4 no weakness , mauro foot tingling   Ambulating,     Hemoglobin   Date Value Ref Range Status   01/20/2023 12.3 11.7 - 15.7 g/dL Final   ]      Impression / Plan      Plan for today:    Patient doing well  Ambulate with help  PT OT,   D/c abhinav this am  discharge home today

## 2023-01-20 NOTE — PLAN OF CARE
Goal Outcome Evaluation:      Plan of Care Reviewed With: patient    Overall Patient Progress: improvingOverall Patient Progress: improving    Patient vital signs are at baseline: No,  Reason:  NC 2 LPM  Patient able to ambulate as they were prior to admission or with assist devices provided by therapies during their stay:  No,  Reason:  A2 with walker/GB, stood at edge of bed and marched in place  Patient MUST void prior to discharge:  No,  Reason:  la removed this morning at 5:50 AM, DTV  Patient able to tolerate oral intake:  Yes, regular diet  Pain has adequate pain control using Oral analgesics:  Yes, PRN oxycodone and scheduled medications  Does patient have an identified :  Yes  Has goal D/C date and time been discussed with patient:  No,  Reason:  plan to have PT/OT today    Pt A/O x4. Dressing to back with dried drainage. CMS intact. IV ancef completed, SL. Plan to discharge home when medically ready.

## 2023-06-10 ENCOUNTER — HEALTH MAINTENANCE LETTER (OUTPATIENT)
Age: 58
End: 2023-06-10

## 2023-08-19 ENCOUNTER — HEALTH MAINTENANCE LETTER (OUTPATIENT)
Age: 58
End: 2023-08-19

## 2024-03-16 ENCOUNTER — HEALTH MAINTENANCE LETTER (OUTPATIENT)
Age: 59
End: 2024-03-16

## 2024-08-03 ENCOUNTER — HEALTH MAINTENANCE LETTER (OUTPATIENT)
Age: 59
End: 2024-08-03

## 2024-09-06 ENCOUNTER — ANESTHESIA EVENT (OUTPATIENT)
Dept: SURGERY | Facility: AMBULATORY SURGERY CENTER | Age: 59
End: 2024-09-06
Payer: COMMERCIAL

## 2024-09-09 ENCOUNTER — ANESTHESIA (OUTPATIENT)
Dept: SURGERY | Facility: AMBULATORY SURGERY CENTER | Age: 59
End: 2024-09-09
Payer: COMMERCIAL

## 2024-09-09 ENCOUNTER — HOSPITAL ENCOUNTER (OUTPATIENT)
Facility: AMBULATORY SURGERY CENTER | Age: 59
Discharge: HOME | End: 2024-09-09
Attending: COLON & RECTAL SURGERY
Payer: COMMERCIAL

## 2024-09-09 VITALS
SYSTOLIC BLOOD PRESSURE: 115 MMHG | OXYGEN SATURATION: 95 % | HEART RATE: 76 BPM | DIASTOLIC BLOOD PRESSURE: 58 MMHG | TEMPERATURE: 98.7 F | HEIGHT: 62 IN | WEIGHT: 180 LBS | RESPIRATION RATE: 16 BRPM | BODY MASS INDEX: 33.13 KG/M2

## 2024-09-09 LAB — COLONOSCOPY: NORMAL

## 2024-09-09 RX ORDER — SODIUM CHLORIDE, SODIUM LACTATE, POTASSIUM CHLORIDE, CALCIUM CHLORIDE 600; 310; 30; 20 MG/100ML; MG/100ML; MG/100ML; MG/100ML
INJECTION, SOLUTION INTRAVENOUS CONTINUOUS
Status: DISCONTINUED | OUTPATIENT
Start: 2024-09-09 | End: 2024-09-10 | Stop reason: HOSPADM

## 2024-09-09 RX ORDER — HYDROMORPHONE HCL IN WATER/PF 6 MG/30 ML
0.2 PATIENT CONTROLLED ANALGESIA SYRINGE INTRAVENOUS EVERY 5 MIN PRN
Status: DISCONTINUED | OUTPATIENT
Start: 2024-09-09 | End: 2024-09-10 | Stop reason: HOSPADM

## 2024-09-09 RX ORDER — FENTANYL CITRATE 0.05 MG/ML
25 INJECTION, SOLUTION INTRAMUSCULAR; INTRAVENOUS
Status: DISCONTINUED | OUTPATIENT
Start: 2024-09-09 | End: 2024-09-10 | Stop reason: HOSPADM

## 2024-09-09 RX ORDER — MEPERIDINE HYDROCHLORIDE 25 MG/ML
12.5 INJECTION INTRAMUSCULAR; INTRAVENOUS; SUBCUTANEOUS EVERY 5 MIN PRN
Status: DISCONTINUED | OUTPATIENT
Start: 2024-09-09 | End: 2024-09-10 | Stop reason: HOSPADM

## 2024-09-09 RX ORDER — FENTANYL CITRATE 0.05 MG/ML
50 INJECTION, SOLUTION INTRAMUSCULAR; INTRAVENOUS EVERY 5 MIN PRN
Status: DISCONTINUED | OUTPATIENT
Start: 2024-09-09 | End: 2024-09-10 | Stop reason: HOSPADM

## 2024-09-09 RX ORDER — FENTANYL CITRATE 0.05 MG/ML
25 INJECTION, SOLUTION INTRAMUSCULAR; INTRAVENOUS EVERY 5 MIN PRN
Status: DISCONTINUED | OUTPATIENT
Start: 2024-09-09 | End: 2024-09-10 | Stop reason: HOSPADM

## 2024-09-09 RX ORDER — PROPOFOL 10 MG/ML
INJECTION, EMULSION INTRAVENOUS PRN
Status: DISCONTINUED | OUTPATIENT
Start: 2024-09-09 | End: 2024-09-09

## 2024-09-09 RX ORDER — GLYCOPYRROLATE 0.2 MG/ML
INJECTION, SOLUTION INTRAMUSCULAR; INTRAVENOUS PRN
Status: DISCONTINUED | OUTPATIENT
Start: 2024-09-09 | End: 2024-09-09

## 2024-09-09 RX ORDER — NALOXONE HYDROCHLORIDE 0.4 MG/ML
0.1 INJECTION, SOLUTION INTRAMUSCULAR; INTRAVENOUS; SUBCUTANEOUS
Status: DISCONTINUED | OUTPATIENT
Start: 2024-09-09 | End: 2024-09-10 | Stop reason: HOSPADM

## 2024-09-09 RX ORDER — ONDANSETRON 2 MG/ML
INJECTION INTRAMUSCULAR; INTRAVENOUS PRN
Status: DISCONTINUED | OUTPATIENT
Start: 2024-09-09 | End: 2024-09-09

## 2024-09-09 RX ORDER — HYDROMORPHONE HCL IN WATER/PF 6 MG/30 ML
0.4 PATIENT CONTROLLED ANALGESIA SYRINGE INTRAVENOUS EVERY 5 MIN PRN
Status: DISCONTINUED | OUTPATIENT
Start: 2024-09-09 | End: 2024-09-10 | Stop reason: HOSPADM

## 2024-09-09 RX ORDER — LIDOCAINE HYDROCHLORIDE 20 MG/ML
INJECTION, SOLUTION INFILTRATION; PERINEURAL PRN
Status: DISCONTINUED | OUTPATIENT
Start: 2024-09-09 | End: 2024-09-09

## 2024-09-09 RX ORDER — PROPOFOL 10 MG/ML
INJECTION, EMULSION INTRAVENOUS CONTINUOUS PRN
Status: DISCONTINUED | OUTPATIENT
Start: 2024-09-09 | End: 2024-09-09

## 2024-09-09 RX ORDER — DEXAMETHASONE SODIUM PHOSPHATE 4 MG/ML
4 INJECTION, SOLUTION INTRA-ARTICULAR; INTRALESIONAL; INTRAMUSCULAR; INTRAVENOUS; SOFT TISSUE
Status: DISCONTINUED | OUTPATIENT
Start: 2024-09-09 | End: 2024-09-10 | Stop reason: HOSPADM

## 2024-09-09 RX ORDER — ONDANSETRON 2 MG/ML
4 INJECTION INTRAMUSCULAR; INTRAVENOUS EVERY 30 MIN PRN
Status: DISCONTINUED | OUTPATIENT
Start: 2024-09-09 | End: 2024-09-10 | Stop reason: HOSPADM

## 2024-09-09 RX ORDER — ONDANSETRON 4 MG/1
4 TABLET, ORALLY DISINTEGRATING ORAL EVERY 30 MIN PRN
Status: DISCONTINUED | OUTPATIENT
Start: 2024-09-09 | End: 2024-09-10 | Stop reason: HOSPADM

## 2024-09-09 RX ORDER — KETOROLAC TROMETHAMINE 15 MG/ML
15 INJECTION, SOLUTION INTRAMUSCULAR; INTRAVENOUS
Status: DISCONTINUED | OUTPATIENT
Start: 2024-09-09 | End: 2024-09-10 | Stop reason: HOSPADM

## 2024-09-09 RX ORDER — ONDANSETRON 4 MG/1
4 TABLET, ORALLY DISINTEGRATING ORAL
Status: DISCONTINUED | OUTPATIENT
Start: 2024-09-09 | End: 2024-09-10 | Stop reason: HOSPADM

## 2024-09-09 RX ADMIN — PROPOFOL 160 MCG/KG/MIN: 10 INJECTION, EMULSION INTRAVENOUS at 11:45

## 2024-09-09 RX ADMIN — ONDANSETRON 4 MG: 2 INJECTION INTRAMUSCULAR; INTRAVENOUS at 11:44

## 2024-09-09 RX ADMIN — LIDOCAINE HYDROCHLORIDE 3 ML: 20 INJECTION, SOLUTION INFILTRATION; PERINEURAL at 11:45

## 2024-09-09 RX ADMIN — PROPOFOL 30 MG: 10 INJECTION, EMULSION INTRAVENOUS at 11:45

## 2024-09-09 RX ADMIN — SODIUM CHLORIDE, SODIUM LACTATE, POTASSIUM CHLORIDE, CALCIUM CHLORIDE: 600; 310; 30; 20 INJECTION, SOLUTION INTRAVENOUS at 11:36

## 2024-09-09 RX ADMIN — GLYCOPYRROLATE 0.1 MG: 0.2 INJECTION, SOLUTION INTRAMUSCULAR; INTRAVENOUS at 11:44

## 2024-09-09 RX ADMIN — GLYCOPYRROLATE 0.1 MG: 0.2 INJECTION, SOLUTION INTRAMUSCULAR; INTRAVENOUS at 11:59

## 2024-09-09 NOTE — H&P
Colon & Rectal Surgery Pre-procedure H&P    9/9/2024     Primary Care Physician     Chief Complaint/Reason for Procedure:             screening    History and Physical:   Joan Stallworth is a 58 year old female presenting for colonoscopy for screening purposes.       Past Medical History   I have reviewed this patient's medical history and updated it with pertinent information if needed.   Past Medical History:   Diagnosis Date    Anal intraepithelial neoplasia III (AIN III)     Arthritis     Diabetes (H)     Motion sickness     Osteopenia     Other chronic pain     PONV (postoperative nausea and vomiting)     Uncomplicated asthma        Past Surgical History   I have reviewed this patient's surgical history and updated it with pertinent information if needed.  Past Surgical History:   Procedure Laterality Date    ANESTHESIA OUT OF OR MRI N/A 08/19/2022    Procedure: ANESTHESIA OUT OF OR Magnetic resonance imaging lumbar and thoracic spine without contrast @1100;  Surgeon: GENERIC ANESTHESIA PROVIDER;  Location: [facility]    BACK SURGERY  1995    discectomy    CERVICAL FUSION  2018    C4-7    CERVICAL FUSION  2019    C3-T2    COLONOSCOPY      ENT SURGERY      tonsilectomy and adenoidectomy as a child    FUSION SPINE POSTERIOR MINIMALLY INVASIVE THREE + LEVELS N/A 1/19/2023    Procedure: L1/2/3/4/5 Oblique Lateral Lumbar Interbody fusion with discectomy L1/2/3/4/5 Posterior minimally invasive pedicle screw placement and posterolateral instrumentation and fusion;  Surgeon: Alyse Tavares MD;  Location:  OR       Allergies:    Allergies   Allergen Reactions    Polyethylene Glycol Anaphylaxis    Polysorbate [Sorbitan] Anaphylaxis    Aspirin Nausea and Nausea and Vomiting     Can take asa 81mg      Imitrex [Sumatriptan] Hives and Swelling    Oseltamivir Itching    Sulfa Antibiotics Hives and Swelling    Gabapentin Dizziness and Rash    Latex Rash and Blisters    Lobster [Crustaceans] Hives and Rash         Prior to Admission  Medications   Cannot display prior to admission medications because the patient has not been admitted in this contact.     Allergies   Allergies   Allergen Reactions    Polyethylene Glycol Anaphylaxis    Polysorbate [Sorbitan] Anaphylaxis    Aspirin Nausea and Nausea and Vomiting     Can take asa 81mg      Imitrex [Sumatriptan] Hives and Swelling    Oseltamivir Itching    Sulfa Antibiotics Hives and Swelling    Gabapentin Dizziness and Rash    Latex Rash and Blisters    Lobster [Crustaceans] Hives and Rash       Social History   I have reviewed this patient's social history and updated it with pertinent information if needed. Joan Stallworth  reports that she has quit smoking. She has never used smokeless tobacco. She reports current alcohol use. She reports that she does not use drugs.    Family History   I have reviewed this patient's family history and updated it with pertinent information if needed.   History reviewed. No pertinent family history.    Review of Systems   The 10 point Review of Systems is negative other than noted in the HPI or here.     Physical Exam   Temp: 97.3  F (36.3  C) Temp src: Temporal BP: 136/73 Pulse: 77   Resp: 16 SpO2: 98 % O2 Device: None (Room air)    Vital Signs with Ranges  Temp:  [97.3  F (36.3  C)] 97.3  F (36.3  C)  Pulse:  [77] 77  Resp:  [16] 16  BP: (136)/(73) 136/73  SpO2:  [98 %] 98 %  180 lbs 0 oz    Aaox3,nad  Lungs clear b  rrr    Assessment/Plan:  Joan Stallworth presents for colonoscopy. Risks and benefits of colonoscopy discussed in detail and informed consent obtained.      - proceed with colonoscopy    Vicki Johnson MD, MS  [facility]  Office: [phone]  9/9/2024 11:37 AM

## 2024-09-09 NOTE — ANESTHESIA CARE TRANSFER NOTE
Patient: Joan Stallworth    Procedure: Procedure(s):  COLONOSCOPY       Diagnosis: Screen for colon cancer [Z12.11]  Diagnosis Additional Information: No value filed.    Anesthesia Type:   MAC     Note:    Oropharynx: spontaneously breathing  Level of Consciousness: awake  Oxygen Supplementation: room air    Independent Airway: airway patency satisfactory and stable  Dentition: dentition unchanged  Vital Signs Stable: post-procedure vital signs reviewed and stable  Report to RN Given: handoff report given  Patient transferred to: Phase II    Handoff Report: Identifed the Patient, Identified the Reponsible Provider, Reviewed the pertinent medical history, Discussed the surgical course, Reviewed Intra-OP anesthesia mangement and issues during anesthesia, Set expectations for post-procedure period and Allowed opportunity for questions and acknowledgement of understanding      Vitals:  Vitals Value Taken Time   /52 09/09/24 1207   Temp 98.7  F (37.1  C) 09/09/24 1207   Pulse     Resp 16 09/09/24 1207   SpO2 98 % 09/09/24 1207     HR 70  Electronically Signed By: WALTER Fermin CRNA  September 9, 2024  12:09 PM

## 2024-09-09 NOTE — ANESTHESIA POSTPROCEDURE EVALUATION
Patient: Joan Stallworth    Procedure: Procedure(s):  COLONOSCOPY       Anesthesia Type:  MAC    Note:     Postop Pain Control: Uneventful            Sign Out: Well controlled pain   PONV: No   Neuro/Psych: Uneventful            Sign Out: Acceptable/Baseline neuro status   Airway/Respiratory: Uneventful            Sign Out: Acceptable/Baseline resp. status   CV/Hemodynamics: Uneventful            Sign Out: Acceptable CV status; No obvious hypovolemia; No obvious fluid overload   Other NRE: NONE   DID A NON-ROUTINE EVENT OCCUR? No           Last vitals:  Vitals Value Taken Time   /58 09/09/24 1245   Temp 98.7  F (37.1  C) 09/09/24 1207   Pulse 80 09/09/24 1243   Resp 16 09/09/24 1245   SpO2 95 % 09/09/24 1245   Vitals shown include unfiled device data.    Electronically Signed By: Boris Kwok MD  September 9, 2024  1:05 PM

## 2024-09-09 NOTE — ANESTHESIA PREPROCEDURE EVALUATION
Anesthesia Pre-Procedure Evaluation    Patient: Joan Stallworth   MRN: [id number] : 1965        Procedure : Procedure(s):  COLONOSCOPY     Past Medical History:   Diagnosis Date    Anal intraepithelial neoplasia III (AIN III)     Arthritis     Diabetes (H)     Motion sickness     Osteopenia     Other chronic pain     PONV (postoperative nausea and vomiting)     Uncomplicated asthma       Past Surgical History:   Procedure Laterality Date    ANESTHESIA OUT OF OR MRI N/A 2022    Procedure: ANESTHESIA OUT OF OR Magnetic resonance imaging lumbar and thoracic spine without contrast @1100;  Surgeon: GENERIC ANESTHESIA PROVIDER;  Location: [facility] OR    BACK SURGERY      discectomy    CERVICAL FUSION      C4-7    CERVICAL FUSION      C3-T2    COLONOSCOPY      ENT SURGERY      tonsilectomy and adenoidectomy as a child    FUSION SPINE POSTERIOR MINIMALLY INVASIVE THREE + LEVELS N/A 2023    Procedure: L1/2/3/4/5 Oblique Lateral Lumbar Interbody fusion with discectomy L1/2/3/4/5 Posterior minimally invasive pedicle screw placement and posterolateral instrumentation and fusion;  Surgeon: Alyse Tavares MD;  Location: [facility] OR      Allergies   Allergen Reactions    Polyethylene Glycol Anaphylaxis    Polysorbate [Sorbitan] Anaphylaxis    Aspirin Nausea and Nausea and Vomiting     Can take asa 81mg      Imitrex [Sumatriptan] Hives and Swelling    Oseltamivir Itching    Sulfa Antibiotics Hives and Swelling    Gabapentin Dizziness and Rash    Latex Rash and Blisters    Lobster [Crustaceans] Hives and Rash      Social History     Tobacco Use    Smoking status: Former    Smokeless tobacco: Never   Substance Use Topics    Alcohol use: Yes     Comment: rarely      Wt Readings from Last 1 Encounters:   24 81.6 kg (180 lb)        Anesthesia Evaluation            ROS/MED HX  ENT/Pulmonary:  - neg pulmonary ROS   (+)           allergic rhinitis,          Mild Persistent, asthma                  Neurologic:  - neg  "neurologic ROS   (+)    peripheral neuropathy,                            Cardiovascular:  - neg cardiovascular ROS     METS/Exercise Tolerance: >4 METS    Hematologic:  - neg hematologic  ROS     Musculoskeletal:  - neg musculoskeletal ROS (+)  arthritis,             GI/Hepatic:  - neg GI/hepatic ROS   (+) GERD, Asymptomatic on medication,                  Renal/Genitourinary:  - neg Renal ROS     Endo:  - neg endo ROS   (+)  type II DM,   Not using insulin,          Obesity,       Psychiatric/Substance Use:  - neg psychiatric ROS   (+) psychiatric history anxiety       Infectious Disease:  - neg infectious disease ROS     Malignancy:  - neg malignancy ROS     Other:      (+)  , H/O Chronic Pain,         Physical Exam    Airway        Mallampati: II   TM distance: > 3 FB   Neck ROM: full   Mouth opening: > 3 cm    Respiratory Devices and Support         Dental       (+) Minor Abnormalities - some fillings, tiny chips      Cardiovascular          Rhythm and rate: regular and normal     Pulmonary           breath sounds clear to auscultation           OUTSIDE LABS:  CBC:   Lab Results   Component Value Date    WBC 12.2 (H) 01/20/2023    HGB 12.3 01/20/2023    HCT 37.9 01/20/2023     01/20/2023     BMP:   Lab Results   Component Value Date     01/20/2023     09/16/2022    POTASSIUM 4.7 01/20/2023    POTASSIUM 4.5 09/16/2022    CHLORIDE 103 01/20/2023    CHLORIDE 104 09/16/2022    CO2 32 (H) 01/20/2023    CO2 26 09/16/2022    BUN 12.7 01/20/2023    BUN 18 09/16/2022    CR 0.64 01/20/2023    CR 0.62 09/16/2022     (H) 01/20/2023     (H) 01/20/2023     COAGS: No results found for: \"PTT\", \"INR\", \"FIBR\"  POC: No results found for: \"BGM\", \"HCG\", \"HCGS\"  HEPATIC:   Lab Results   Component Value Date    ALBUMIN 4.2 09/16/2022    PROTTOTAL 7.6 09/16/2022    ALT 35 09/16/2022    AST 20 09/16/2022    ALKPHOS 80 09/16/2022    BILITOTAL 0.6 09/16/2022     OTHER:   Lab Results   Component Value Date "    AZEB 9.3 01/20/2023       Anesthesia Plan    ASA Status:  2    NPO Status:  NPO Appropriate    Anesthesia Type: MAC.   Induction: Intravenous, Propofol.   Maintenance: TIVA.        Consents    Anesthesia Plan(s) and associated risks, benefits, and realistic alternatives discussed. Questions answered and patient/representative(s) expressed understanding.     - Discussed:     - Discussed with:  Patient      - Extended Intubation/Ventilatory Support Discussed: No.      - Patient is DNR/DNI Status: No     Use of blood products discussed: No .     Postoperative Care       PONV prophylaxis: Ondansetron (or other 5HT-3), Dexamethasone or Solumedrol, Background Propofol Infusion     Comments:                Boris Kwok MD

## 2024-09-09 NOTE — DISCHARGE INSTRUCTIONS
Discharge Instructions:    Take you medications as directed and follow up with you primary provider as scheduled.   You should expect to pass air from your rectum after the procedure.   Follow these care guidelines during your recovery for the next 24 hours.   If you have any questions or concerns please contact the provider that performed your procedure.     You were given medicine for sedation:  You have been given medicines during your procedure that might make you sleepy and weak. To prevent problems:    *Rest for the rest of the day after you are home. You should be back to your normal activity tomorrow.  *For the next 24 hours:   -Do not drink alcoholic beverages.   -Do not make any important decisions or sign any legal forms.   -Do not work around machinery or power equipment.     The medicines used for sedation may make you feel nauseated.   *Start with clear liquids, such as tea, jell-o, broth and ginger ale. As you feel better you may add soft foods such as pudding and ice cream.   *When you no longer feel nauseated, you may try your normal diet.     You should be back to eating your normal after 24 hours.     Call if you have any of these problems:  *Fever of 101 degree F or 38 degrees C  *Bleeding from the rectum  *Black stool or blood in your bowel movements  *Nausea with vomiting that does not ease after a few hours.  *Abdominal pain or bloating  *Fainting    If you have any questions or concerns regarding your procedure, please contact TAZ Kapoor, her office number is [phone].

## 2024-10-12 ENCOUNTER — HEALTH MAINTENANCE LETTER (OUTPATIENT)
Age: 59
End: 2024-10-12

## (undated) DEVICE — CATH TRAY FOLEY SURESTEP 16FR W/URNE MTR STLK LATEX A303316A

## (undated) DEVICE — PACK SMALL SPINE RIDGES

## (undated) DEVICE — LINEN ORTHO ACL PACK 5447

## (undated) DEVICE — Device

## (undated) DEVICE — DRSG GAUZE 4X4" TRAY

## (undated) DEVICE — BAG CLEAR TRASH 1.3M 39X33" P4040C

## (undated) DEVICE — SU VICRYL 2-0 CT-2 CR 8X18" J726D

## (undated) DEVICE — DRAPE IOBAN ISOLATION VERTICAL 6619

## (undated) DEVICE — DRSG ABDOMINAL 07 1/2X8" 7197D

## (undated) DEVICE — SUCTION MANIFOLD NEPTUNE 2 SYS 4 PORT 0702-020-000

## (undated) DEVICE — DRAPE MAYO STAND 23X54 8337

## (undated) DEVICE — PAD PROAXIS TABLE KIT SPK10182

## (undated) DEVICE — SYR 03ML LL W/O NDL 309657

## (undated) DEVICE — PREP DURAPREP 26ML APL 8630

## (undated) DEVICE — IOM FLAT FEE

## (undated) DEVICE — SYR 30ML LL W/O NDL 302832

## (undated) DEVICE — GLOVE BIOGEL PI ULTRATOUCH SZ 8.0 41180

## (undated) DEVICE — DRAPE C-ARM 60X42" 1013

## (undated) DEVICE — SYR 10ML LL W/O NDL 302995

## (undated) DEVICE — DRSG STERI STRIP 1/2X4" R1547

## (undated) DEVICE — CUSHION INSERT LG PRONE VIEW JACKSON TABLE

## (undated) DEVICE — SPONGE RAY-TEC 4X8" 7318

## (undated) RX ORDER — METHOCARBAMOL 500 MG/1
TABLET, FILM COATED ORAL
Status: DISPENSED
Start: 2023-01-19

## (undated) RX ORDER — GLYCOPYRROLATE 0.2 MG/ML
INJECTION INTRAMUSCULAR; INTRAVENOUS
Status: DISPENSED
Start: 2023-01-19

## (undated) RX ORDER — BUPIVACAINE HYDROCHLORIDE 2.5 MG/ML
INJECTION, SOLUTION EPIDURAL; INFILTRATION; INTRACAUDAL
Status: DISPENSED
Start: 2023-01-19

## (undated) RX ORDER — DEXAMETHASONE SODIUM PHOSPHATE 4 MG/ML
INJECTION, SOLUTION INTRA-ARTICULAR; INTRALESIONAL; INTRAMUSCULAR; INTRAVENOUS; SOFT TISSUE
Status: DISPENSED
Start: 2023-01-19

## (undated) RX ORDER — ALBUTEROL SULFATE 0.83 MG/ML
SOLUTION RESPIRATORY (INHALATION)
Status: DISPENSED
Start: 2023-01-19

## (undated) RX ORDER — LIDOCAINE HYDROCHLORIDE AND EPINEPHRINE 10; 10 MG/ML; UG/ML
INJECTION, SOLUTION INFILTRATION; PERINEURAL
Status: DISPENSED
Start: 2023-01-19

## (undated) RX ORDER — PROPOFOL 10 MG/ML
INJECTION, EMULSION INTRAVENOUS
Status: DISPENSED
Start: 2023-01-19

## (undated) RX ORDER — BUPIVACAINE HYDROCHLORIDE 7.5 MG/ML
INJECTION, SOLUTION EPIDURAL; RETROBULBAR
Status: DISPENSED
Start: 2023-01-19

## (undated) RX ORDER — TRANEXAMIC ACID 10 MG/ML
INJECTION, SOLUTION INTRAVENOUS
Status: DISPENSED
Start: 2023-01-19

## (undated) RX ORDER — CEFAZOLIN SODIUM/WATER 2 G/20 ML
SYRINGE (ML) INTRAVENOUS
Status: DISPENSED
Start: 2023-01-19

## (undated) RX ORDER — GABAPENTIN 300 MG/1
CAPSULE ORAL
Status: DISPENSED
Start: 2023-01-19

## (undated) RX ORDER — KETOROLAC TROMETHAMINE 30 MG/ML
INJECTION, SOLUTION INTRAMUSCULAR; INTRAVENOUS
Status: DISPENSED
Start: 2023-01-19

## (undated) RX ORDER — METHADONE HYDROCHLORIDE 10 MG/ML
INJECTION, SOLUTION INTRAMUSCULAR; INTRAVENOUS; SUBCUTANEOUS
Status: DISPENSED
Start: 2023-01-19

## (undated) RX ORDER — OXYCODONE HYDROCHLORIDE 5 MG/1
TABLET ORAL
Status: DISPENSED
Start: 2023-01-19

## (undated) RX ORDER — FENTANYL CITRATE-0.9 % NACL/PF 10 MCG/ML
PLASTIC BAG, INJECTION (ML) INTRAVENOUS
Status: DISPENSED
Start: 2023-01-19

## (undated) RX ORDER — LIDOCAINE HYDROCHLORIDE 10 MG/ML
INJECTION, SOLUTION EPIDURAL; INFILTRATION; INTRACAUDAL; PERINEURAL
Status: DISPENSED
Start: 2023-01-19

## (undated) RX ORDER — ONDANSETRON 2 MG/ML
INJECTION INTRAMUSCULAR; INTRAVENOUS
Status: DISPENSED
Start: 2023-01-19

## (undated) RX ORDER — FENTANYL CITRATE 50 UG/ML
INJECTION, SOLUTION INTRAMUSCULAR; INTRAVENOUS
Status: DISPENSED
Start: 2022-08-19

## (undated) RX ORDER — ACETAMINOPHEN 325 MG/1
TABLET ORAL
Status: DISPENSED
Start: 2023-01-19

## (undated) RX ORDER — FENTANYL CITRATE 50 UG/ML
INJECTION, SOLUTION INTRAMUSCULAR; INTRAVENOUS
Status: DISPENSED
Start: 2023-01-19

## (undated) RX ORDER — TRIAMCINOLONE ACETONIDE 40 MG/ML
INJECTION, SUSPENSION INTRA-ARTICULAR; INTRAMUSCULAR
Status: DISPENSED
Start: 2023-01-19

## (undated) RX ORDER — ONDANSETRON 2 MG/ML
INJECTION INTRAMUSCULAR; INTRAVENOUS
Status: DISPENSED
Start: 2022-08-19

## (undated) RX ORDER — SCOLOPAMINE TRANSDERMAL SYSTEM 1 MG/1
PATCH, EXTENDED RELEASE TRANSDERMAL
Status: DISPENSED
Start: 2023-01-19